# Patient Record
Sex: FEMALE | Race: BLACK OR AFRICAN AMERICAN | Employment: UNEMPLOYED | URBAN - METROPOLITAN AREA
[De-identification: names, ages, dates, MRNs, and addresses within clinical notes are randomized per-mention and may not be internally consistent; named-entity substitution may affect disease eponyms.]

---

## 2018-06-13 ENCOUNTER — HOSPITAL ENCOUNTER (OUTPATIENT)
Age: 48
Setting detail: SPECIMEN
Discharge: HOME | End: 2018-06-13
Payer: MEDICARE

## 2018-06-13 LAB
ALBUMIN SERPL-MCNC: 4.2 G/DL (ref 3.5–5.2)
ALBUMIN/GLOBULIN RATIO: 1.3 (ref 1–2.5)
ALP BLD-CCNC: 86 U/L (ref 35–104)
ALT SERPL-CCNC: 8 U/L (ref 5–33)
ANION GAP SERPL CALCULATED.3IONS-SCNC: 11 MMOL/L (ref 9–17)
AST SERPL-CCNC: 12 U/L
BILIRUB SERPL-MCNC: <0.1 MG/DL (ref 0.3–1.2)
BUN BLDV-MCNC: 2 MG/DL (ref 6–20)
BUN/CREAT BLD: ABNORMAL (ref 9–20)
CALCIUM SERPL-MCNC: 9.2 MG/DL (ref 8.6–10.4)
CHLORIDE BLD-SCNC: 103 MMOL/L (ref 98–107)
CHOLESTEROL, FASTING: 178 MG/DL
CHOLESTEROL/HDL RATIO: 5.6
CO2: 22 MMOL/L (ref 20–31)
CREAT SERPL-MCNC: 0.57 MG/DL (ref 0.5–0.9)
GFR AFRICAN AMERICAN: >60 ML/MIN
GFR NON-AFRICAN AMERICAN: >60 ML/MIN
GFR SERPL CREATININE-BSD FRML MDRD: ABNORMAL ML/MIN/{1.73_M2}
GFR SERPL CREATININE-BSD FRML MDRD: ABNORMAL ML/MIN/{1.73_M2}
GLUCOSE FASTING: 88 MG/DL (ref 70–99)
HDLC SERPL-MCNC: 32 MG/DL
LDL CHOLESTEROL: 88 MG/DL (ref 0–130)
POTASSIUM SERPL-SCNC: 3.8 MMOL/L (ref 3.7–5.3)
SODIUM BLD-SCNC: 136 MMOL/L (ref 135–144)
TOTAL PROTEIN: 7.5 G/DL (ref 6.4–8.3)
TRIGLYCERIDE, FASTING: 288 MG/DL
VLDLC SERPL CALC-MCNC: ABNORMAL MG/DL (ref 1–30)

## 2018-06-21 ENCOUNTER — HOSPITAL ENCOUNTER (OUTPATIENT)
Age: 48
Setting detail: SPECIMEN
Discharge: HOME | End: 2018-06-21
Payer: MEDICARE

## 2018-06-21 LAB
DIRECT EXAM: ABNORMAL
Lab: ABNORMAL
SPECIMEN DESCRIPTION: ABNORMAL
STATUS: ABNORMAL

## 2018-06-25 LAB
HPV SAMPLE: NORMAL
HPV SOURCE: NORMAL
HPV, GENOTYPE 16: NOT DETECTED
HPV, GENOTYPE 18: NOT DETECTED
HPV, HIGH RISK OTHER: NOT DETECTED
HPV, INTERPRETATION: NORMAL

## 2018-07-06 ENCOUNTER — HOSPITAL ENCOUNTER (OUTPATIENT)
Dept: MAMMOGRAPHY | Age: 48
Discharge: HOME | End: 2018-07-08
Payer: MEDICARE

## 2018-07-06 DIAGNOSIS — Z12.39 SCREENING BREAST EXAMINATION: ICD-10-CM

## 2018-07-06 PROCEDURE — 77067 SCR MAMMO BI INCL CAD: CPT

## 2018-07-13 LAB — CYTOLOGY REPORT: NORMAL

## 2021-07-27 ENCOUNTER — HOSPITAL ENCOUNTER (INPATIENT)
Age: 51
LOS: 1 days | Discharge: LEFT BEFORE DISCHARGE | DRG: 133 | End: 2021-07-28
Attending: EMERGENCY MEDICINE | Admitting: INTERNAL MEDICINE
Payer: MEDICARE

## 2021-07-27 ENCOUNTER — APPOINTMENT (OUTPATIENT)
Dept: CT IMAGING | Age: 51
DRG: 133 | End: 2021-07-27
Payer: MEDICARE

## 2021-07-27 ENCOUNTER — APPOINTMENT (OUTPATIENT)
Dept: GENERAL RADIOLOGY | Age: 51
DRG: 133 | End: 2021-07-27
Payer: MEDICARE

## 2021-07-27 DIAGNOSIS — R06.02 SHORTNESS OF BREATH: Primary | ICD-10-CM

## 2021-07-27 PROBLEM — R00.0 SINUS TACHYCARDIA: Status: ACTIVE | Noted: 2021-07-27

## 2021-07-27 PROBLEM — I16.0 HYPERTENSIVE URGENCY: Status: ACTIVE | Noted: 2021-07-27

## 2021-07-27 PROBLEM — F10.129 ALCOHOL ABUSE WITH INTOXICATION (HCC): Status: ACTIVE | Noted: 2021-07-27

## 2021-07-27 PROBLEM — K52.9 DIARRHEA DUE TO ALCOHOL INTAKE: Status: ACTIVE | Noted: 2021-07-27

## 2021-07-27 PROBLEM — E87.6 HYPOKALEMIA: Status: ACTIVE | Noted: 2021-07-27

## 2021-07-27 PROBLEM — F17.200 SMOKING ADDICTION: Status: ACTIVE | Noted: 2021-07-27

## 2021-07-27 PROBLEM — J96.01 ACUTE RESPIRATORY FAILURE WITH HYPOXIA (HCC): Status: ACTIVE | Noted: 2021-07-27

## 2021-07-27 PROBLEM — R77.8 TROPONIN LEVEL ELEVATED: Status: ACTIVE | Noted: 2021-07-27

## 2021-07-27 PROBLEM — R79.89 TROPONIN LEVEL ELEVATED: Status: ACTIVE | Noted: 2021-07-27

## 2021-07-27 LAB
-: NORMAL
ABSOLUTE EOS #: 0.03 K/UL (ref 0–0.44)
ABSOLUTE IMMATURE GRANULOCYTE: <0.03 K/UL (ref 0–0.3)
ABSOLUTE LYMPH #: 2.92 K/UL (ref 1.1–3.7)
ABSOLUTE MONO #: 0.46 K/UL (ref 0.1–1.2)
ALLEN TEST: ABNORMAL
ANION GAP SERPL CALCULATED.3IONS-SCNC: 18 MMOL/L (ref 9–17)
BASOPHILS # BLD: 0 % (ref 0–2)
BASOPHILS ABSOLUTE: <0.03 K/UL (ref 0–0.2)
BNP INTERPRETATION: NORMAL
BUN BLDV-MCNC: 3 MG/DL (ref 6–20)
BUN/CREAT BLD: ABNORMAL (ref 9–20)
CALCIUM SERPL-MCNC: 8.9 MG/DL (ref 8.6–10.4)
CARBOXYHEMOGLOBIN: 0.8 % (ref 0–5)
CHLORIDE BLD-SCNC: 102 MMOL/L (ref 98–107)
CO2: 19 MMOL/L (ref 20–31)
CREAT SERPL-MCNC: 0.62 MG/DL (ref 0.5–0.9)
D-DIMER QUANTITATIVE: 0.74 MG/L FEU
DIFFERENTIAL TYPE: ABNORMAL
EOSINOPHILS RELATIVE PERCENT: 1 % (ref 1–4)
FIO2: ABNORMAL
GFR AFRICAN AMERICAN: >60 ML/MIN
GFR NON-AFRICAN AMERICAN: >60 ML/MIN
GFR SERPL CREATININE-BSD FRML MDRD: ABNORMAL ML/MIN/{1.73_M2}
GFR SERPL CREATININE-BSD FRML MDRD: ABNORMAL ML/MIN/{1.73_M2}
GLUCOSE BLD-MCNC: 145 MG/DL (ref 70–99)
HCG QUALITATIVE: NEGATIVE
HCO3 VENOUS: 23.7 MMOL/L (ref 24–30)
HCT VFR BLD CALC: 43.3 % (ref 36.3–47.1)
HEMOGLOBIN: 13.7 G/DL (ref 11.9–15.1)
IMMATURE GRANULOCYTES: 0 %
LYMPHOCYTES # BLD: 48 % (ref 24–43)
MAGNESIUM: 1.9 MG/DL (ref 1.6–2.6)
MCH RBC QN AUTO: 29.6 PG (ref 25.2–33.5)
MCHC RBC AUTO-ENTMCNC: 31.6 G/DL (ref 28.4–34.8)
MCV RBC AUTO: 93.5 FL (ref 82.6–102.9)
METHEMOGLOBIN: ABNORMAL % (ref 0–1.5)
MODE: ABNORMAL
MONOCYTES # BLD: 8 % (ref 3–12)
NEGATIVE BASE EXCESS, VEN: 0.2 MMOL/L (ref 0–2)
NOTIFICATION TIME: ABNORMAL
NOTIFICATION: ABNORMAL
NRBC AUTOMATED: 0 PER 100 WBC
O2 DEVICE/FLOW/%: ABNORMAL
O2 SAT, VEN: 86.8 % (ref 60–85)
OXYHEMOGLOBIN: ABNORMAL % (ref 95–98)
PATIENT TEMP: 37
PCO2, VEN, TEMP ADJ: ABNORMAL MMHG (ref 39–55)
PCO2, VEN: 38.3 (ref 39–55)
PDW BLD-RTO: 14.5 % (ref 11.8–14.4)
PEEP/CPAP: ABNORMAL
PH VENOUS: 7.41 (ref 7.32–7.42)
PH, VEN, TEMP ADJ: ABNORMAL (ref 7.32–7.42)
PLATELET # BLD: ABNORMAL K/UL (ref 138–453)
PLATELET ESTIMATE: ABNORMAL
PLATELET, FLUORESCENCE: NORMAL K/UL (ref 138–453)
PLATELET, IMMATURE FRACTION: NORMAL % (ref 1.1–10.3)
PMV BLD AUTO: ABNORMAL FL (ref 8.1–13.5)
PO2, VEN, TEMP ADJ: ABNORMAL MMHG (ref 30–50)
PO2, VEN: 52.6 (ref 30–50)
POSITIVE BASE EXCESS, VEN: ABNORMAL MMOL/L (ref 0–2)
POTASSIUM SERPL-SCNC: 2.9 MMOL/L (ref 3.7–5.3)
PRO-BNP: 202 PG/ML
PSV: ABNORMAL
PT. POSITION: ABNORMAL
RBC # BLD: 4.63 M/UL (ref 3.95–5.11)
RBC # BLD: ABNORMAL 10*6/UL
REASON FOR REJECTION: NORMAL
RESPIRATORY RATE: ABNORMAL
SAMPLE SITE: ABNORMAL
SARS-COV-2, RAPID: NOT DETECTED
SEG NEUTROPHILS: 43 % (ref 36–65)
SEGMENTED NEUTROPHILS ABSOLUTE COUNT: 2.61 K/UL (ref 1.5–8.1)
SET RATE: ABNORMAL
SODIUM BLD-SCNC: 139 MMOL/L (ref 135–144)
SPECIMEN DESCRIPTION: NORMAL
TEXT FOR RESPIRATORY: ABNORMAL
THYROXINE, FREE: 1.21 NG/DL (ref 0.93–1.7)
TOTAL HB: ABNORMAL G/DL (ref 12–16)
TOTAL RATE: ABNORMAL
TROPONIN INTERP: ABNORMAL
TROPONIN T: ABNORMAL NG/ML
TROPONIN, HIGH SENSITIVITY: 22 NG/L (ref 0–14)
TROPONIN, HIGH SENSITIVITY: 24 NG/L (ref 0–14)
TROPONIN, HIGH SENSITIVITY: 33 NG/L (ref 0–14)
TROPONIN, HIGH SENSITIVITY: 35 NG/L (ref 0–14)
TSH SERPL DL<=0.05 MIU/L-ACNC: 0.2 MIU/L (ref 0.3–5)
VT: ABNORMAL
WBC # BLD: 6.1 K/UL (ref 3.5–11.3)
WBC # BLD: ABNORMAL 10*3/UL
ZZ NTE CLEAN UP: ORDERED TEST: NORMAL
ZZ NTE WITH NAME CLEAN UP: SPECIMEN SOURCE: NORMAL

## 2021-07-27 PROCEDURE — 85055 RETICULATED PLATELET ASSAY: CPT

## 2021-07-27 PROCEDURE — 6370000000 HC RX 637 (ALT 250 FOR IP)

## 2021-07-27 PROCEDURE — 6360000002 HC RX W HCPCS: Performed by: GENERAL PRACTICE

## 2021-07-27 PROCEDURE — 1200000000 HC SEMI PRIVATE

## 2021-07-27 PROCEDURE — 84443 ASSAY THYROID STIM HORMONE: CPT

## 2021-07-27 PROCEDURE — 2700000000 HC OXYGEN THERAPY PER DAY

## 2021-07-27 PROCEDURE — 94761 N-INVAS EAR/PLS OXIMETRY MLT: CPT

## 2021-07-27 PROCEDURE — 80048 BASIC METABOLIC PNL TOTAL CA: CPT

## 2021-07-27 PROCEDURE — 71260 CT THORAX DX C+: CPT

## 2021-07-27 PROCEDURE — 84703 CHORIONIC GONADOTROPIN ASSAY: CPT

## 2021-07-27 PROCEDURE — 94640 AIRWAY INHALATION TREATMENT: CPT

## 2021-07-27 PROCEDURE — 84484 ASSAY OF TROPONIN QUANT: CPT

## 2021-07-27 PROCEDURE — 6360000004 HC RX CONTRAST MEDICATION: Performed by: GENERAL PRACTICE

## 2021-07-27 PROCEDURE — 71045 X-RAY EXAM CHEST 1 VIEW: CPT

## 2021-07-27 PROCEDURE — 83880 ASSAY OF NATRIURETIC PEPTIDE: CPT

## 2021-07-27 PROCEDURE — 6360000002 HC RX W HCPCS

## 2021-07-27 PROCEDURE — 82805 BLOOD GASES W/O2 SATURATION: CPT

## 2021-07-27 PROCEDURE — 99285 EMERGENCY DEPT VISIT HI MDM: CPT

## 2021-07-27 PROCEDURE — 94664 DEMO&/EVAL PT USE INHALER: CPT

## 2021-07-27 PROCEDURE — 94660 CPAP INITIATION&MGMT: CPT

## 2021-07-27 PROCEDURE — 87635 SARS-COV-2 COVID-19 AMP PRB: CPT

## 2021-07-27 PROCEDURE — 85025 COMPLETE CBC W/AUTO DIFF WBC: CPT

## 2021-07-27 PROCEDURE — 36415 COLL VENOUS BLD VENIPUNCTURE: CPT

## 2021-07-27 PROCEDURE — 99223 1ST HOSP IP/OBS HIGH 75: CPT | Performed by: INTERNAL MEDICINE

## 2021-07-27 PROCEDURE — 83735 ASSAY OF MAGNESIUM: CPT

## 2021-07-27 PROCEDURE — 82746 ASSAY OF FOLIC ACID SERUM: CPT

## 2021-07-27 PROCEDURE — 84439 ASSAY OF FREE THYROXINE: CPT

## 2021-07-27 PROCEDURE — 85379 FIBRIN DEGRADATION QUANT: CPT

## 2021-07-27 PROCEDURE — 96374 THER/PROPH/DIAG INJ IV PUSH: CPT

## 2021-07-27 PROCEDURE — 6370000000 HC RX 637 (ALT 250 FOR IP): Performed by: GENERAL PRACTICE

## 2021-07-27 PROCEDURE — 82607 VITAMIN B-12: CPT

## 2021-07-27 PROCEDURE — 93005 ELECTROCARDIOGRAM TRACING: CPT | Performed by: GENERAL PRACTICE

## 2021-07-27 RX ORDER — SODIUM CHLORIDE 0.9 % (FLUSH) 0.9 %
5-40 SYRINGE (ML) INJECTION PRN
Status: DISCONTINUED | OUTPATIENT
Start: 2021-07-27 | End: 2021-07-28 | Stop reason: HOSPADM

## 2021-07-27 RX ORDER — ONDANSETRON 4 MG/1
4 TABLET, ORALLY DISINTEGRATING ORAL EVERY 8 HOURS PRN
Status: DISCONTINUED | OUTPATIENT
Start: 2021-07-27 | End: 2021-07-28 | Stop reason: HOSPADM

## 2021-07-27 RX ORDER — LABETALOL HYDROCHLORIDE 5 MG/ML
10 INJECTION, SOLUTION INTRAVENOUS EVERY 4 HOURS PRN
Status: DISCONTINUED | OUTPATIENT
Start: 2021-07-27 | End: 2021-07-28 | Stop reason: HOSPADM

## 2021-07-27 RX ORDER — ALBUTEROL SULFATE 2.5 MG/3ML
2.5 SOLUTION RESPIRATORY (INHALATION) EVERY 6 HOURS PRN
Status: DISCONTINUED | OUTPATIENT
Start: 2021-07-27 | End: 2021-07-28 | Stop reason: HOSPADM

## 2021-07-27 RX ORDER — ONDANSETRON 2 MG/ML
4 INJECTION INTRAMUSCULAR; INTRAVENOUS EVERY 6 HOURS PRN
Status: DISCONTINUED | OUTPATIENT
Start: 2021-07-27 | End: 2021-07-28 | Stop reason: HOSPADM

## 2021-07-27 RX ORDER — IPRATROPIUM BROMIDE AND ALBUTEROL SULFATE 2.5; .5 MG/3ML; MG/3ML
1 SOLUTION RESPIRATORY (INHALATION) 3 TIMES DAILY
Status: DISCONTINUED | OUTPATIENT
Start: 2021-07-28 | End: 2021-07-28 | Stop reason: HOSPADM

## 2021-07-27 RX ORDER — SODIUM CHLORIDE 0.9 % (FLUSH) 0.9 %
5-40 SYRINGE (ML) INJECTION EVERY 12 HOURS SCHEDULED
Status: DISCONTINUED | OUTPATIENT
Start: 2021-07-27 | End: 2021-07-28 | Stop reason: HOSPADM

## 2021-07-27 RX ORDER — FOLIC ACID 1 MG/1
1 TABLET ORAL DAILY
Status: DISCONTINUED | OUTPATIENT
Start: 2021-07-28 | End: 2021-07-28 | Stop reason: HOSPADM

## 2021-07-27 RX ORDER — POLYETHYLENE GLYCOL 3350 17 G/17G
17 POWDER, FOR SOLUTION ORAL DAILY PRN
Status: DISCONTINUED | OUTPATIENT
Start: 2021-07-27 | End: 2021-07-28 | Stop reason: HOSPADM

## 2021-07-27 RX ORDER — AZITHROMYCIN 250 MG/1
250 TABLET, FILM COATED ORAL DAILY
Status: DISCONTINUED | OUTPATIENT
Start: 2021-07-28 | End: 2021-07-28 | Stop reason: HOSPADM

## 2021-07-27 RX ORDER — IPRATROPIUM BROMIDE AND ALBUTEROL SULFATE 2.5; .5 MG/3ML; MG/3ML
1 SOLUTION RESPIRATORY (INHALATION) 4 TIMES DAILY
Status: DISCONTINUED | OUTPATIENT
Start: 2021-07-27 | End: 2021-07-27

## 2021-07-27 RX ORDER — AZITHROMYCIN 250 MG/1
500 TABLET, FILM COATED ORAL DAILY
Status: COMPLETED | OUTPATIENT
Start: 2021-07-27 | End: 2021-07-27

## 2021-07-27 RX ORDER — METHYLPREDNISOLONE SODIUM SUCCINATE 125 MG/2ML
125 INJECTION, POWDER, LYOPHILIZED, FOR SOLUTION INTRAMUSCULAR; INTRAVENOUS ONCE
Status: COMPLETED | OUTPATIENT
Start: 2021-07-27 | End: 2021-07-27

## 2021-07-27 RX ORDER — ACETAMINOPHEN 325 MG/1
650 TABLET ORAL EVERY 6 HOURS PRN
Status: DISCONTINUED | OUTPATIENT
Start: 2021-07-27 | End: 2021-07-28 | Stop reason: HOSPADM

## 2021-07-27 RX ORDER — ACETAMINOPHEN 650 MG/1
650 SUPPOSITORY RECTAL EVERY 6 HOURS PRN
Status: DISCONTINUED | OUTPATIENT
Start: 2021-07-27 | End: 2021-07-28 | Stop reason: HOSPADM

## 2021-07-27 RX ORDER — SODIUM CHLORIDE 9 MG/ML
25 INJECTION, SOLUTION INTRAVENOUS PRN
Status: DISCONTINUED | OUTPATIENT
Start: 2021-07-27 | End: 2021-07-28 | Stop reason: HOSPADM

## 2021-07-27 RX ORDER — METHYLPREDNISOLONE SODIUM SUCCINATE 125 MG/2ML
40 INJECTION, POWDER, LYOPHILIZED, FOR SOLUTION INTRAMUSCULAR; INTRAVENOUS EVERY 12 HOURS
Status: DISCONTINUED | OUTPATIENT
Start: 2021-07-28 | End: 2021-07-28 | Stop reason: HOSPADM

## 2021-07-27 RX ORDER — GAUZE BANDAGE 2" X 2"
50 BANDAGE TOPICAL DAILY
Status: DISCONTINUED | OUTPATIENT
Start: 2021-07-28 | End: 2021-07-28 | Stop reason: HOSPADM

## 2021-07-27 RX ORDER — ONDANSETRON 2 MG/ML
4 INJECTION INTRAMUSCULAR; INTRAVENOUS ONCE
Status: DISCONTINUED | OUTPATIENT
Start: 2021-07-27 | End: 2021-07-28 | Stop reason: HOSPADM

## 2021-07-27 RX ORDER — IPRATROPIUM BROMIDE AND ALBUTEROL SULFATE 2.5; .5 MG/3ML; MG/3ML
1 SOLUTION RESPIRATORY (INHALATION) ONCE
Status: COMPLETED | OUTPATIENT
Start: 2021-07-27 | End: 2021-07-27

## 2021-07-27 RX ADMIN — IPRATROPIUM BROMIDE AND ALBUTEROL SULFATE 1 AMPULE: .5; 3 SOLUTION RESPIRATORY (INHALATION) at 11:02

## 2021-07-27 RX ADMIN — ENOXAPARIN SODIUM 40 MG: 40 INJECTION SUBCUTANEOUS at 19:36

## 2021-07-27 RX ADMIN — IPRATROPIUM BROMIDE AND ALBUTEROL SULFATE 1 AMPULE: .5; 3 SOLUTION RESPIRATORY (INHALATION) at 19:20

## 2021-07-27 RX ADMIN — IPRATROPIUM BROMIDE AND ALBUTEROL SULFATE 1 AMPULE: .5; 3 SOLUTION RESPIRATORY (INHALATION) at 06:47

## 2021-07-27 RX ADMIN — IPRATROPIUM BROMIDE AND ALBUTEROL SULFATE 1 AMPULE: .5; 3 SOLUTION RESPIRATORY (INHALATION) at 15:21

## 2021-07-27 RX ADMIN — METHYLPREDNISOLONE SODIUM SUCCINATE 125 MG: 125 INJECTION, POWDER, FOR SOLUTION INTRAMUSCULAR; INTRAVENOUS at 06:36

## 2021-07-27 RX ADMIN — IOPAMIDOL 75 ML: 755 INJECTION, SOLUTION INTRAVENOUS at 08:09

## 2021-07-27 RX ADMIN — AZITHROMYCIN 500 MG: 250 TABLET, FILM COATED ORAL at 14:04

## 2021-07-27 RX ADMIN — ALBUTEROL SULFATE 2.5 MG: 2.5 SOLUTION RESPIRATORY (INHALATION) at 06:40

## 2021-07-27 RX ADMIN — POTASSIUM BICARBONATE 40 MEQ: 782 TABLET, EFFERVESCENT ORAL at 09:56

## 2021-07-27 ASSESSMENT — ENCOUNTER SYMPTOMS
VOMITING: 0
SHORTNESS OF BREATH: 1
COUGH: 1
NAUSEA: 0

## 2021-07-27 ASSESSMENT — PAIN SCALES - GENERAL: PAINLEVEL_OUTOF10: 0

## 2021-07-27 NOTE — ED NOTES
Bed: 19  Expected date:   Expected time:   Means of arrival:   Comments:  ABELARDO Barber RN  07/27/21 0366

## 2021-07-27 NOTE — PROGRESS NOTES
Senior Note       This is a 49 y.o. female admitted 7/27/2021 for Shortness of breath [R06.02]. See H&P of admitting/intern resident for more details. Chief Complaint :      Shortness of breath with productive cough since 1 day   Diarrhea  Loss of appetite  Sore throat         Significant Past Medical History :      Chronic active smoking   Alcohol     HPI:     Patient has a recent vomiting where she drank excess alcohol than her usual 1-2 glases of beer and she is a known smoker with active smoking of pack a day and is having SOB since many days and had acute worsening of SOB since yesterday and came to ED for evaluation. Shortness of breath gradual since many days with acute worsening,  Orthopnea +, PND negative, dyspnea on exertion + not  associated with  Chest pain and palpitations. Not on any home oxygen. Initial evaluation in the ED patient was found to be hypoxic with back to using started on BiPAP 30% FiO2. No VBG was drawn on admission. She was found to be tachycardic with heart rate of 123 blood pressure of 163/99. Wells score of  1 with elevation of D-dimer 0.74 patient was taken to CT PE which came back negative. Chest x-ray did not show any signs of congestion or infiltrate. Troponins elevated to 22--> 35-->33 with EKG negative for any ischemic or infarct changes and cardiology was consulted for increased troponinemia. ROS :  Negative for chest pain, palpitations, calf tenderness, leg swelling, immobilization, Denies fever and positive for chills    Past Surgical History : No recent surgery.     Allergic History :  denies    Home Medications : Not on any home medications    Hospitalization history : Not hospitalized      Social history :   Smoking : Active smoker pack a day    Alcohol :  Positive alcohol intake 1-2 beers often do not drink daily, recent heavy alcohol intoxication during party     Recreational drug : admits to smoking marijuana     PCP/specialist doctor : Not seen any PCP    Vitals : Hemodynamically stable currently with elevated blood pressure of 141/99 saturating 100% on 3 L nasal cannula    Brief Examination Findings:     Mild expiratory wheezing bilateral upper and lower lobes after IV Solu-Medrol was given, no calf tenderness, no abnormal murmurs. Looks dehydrated and tachycardic pulses regularly regular    Initial Labs :      Potassium 2.9 bicarb of 19 and anion gap of 18 elevated troponins    BMP:   Recent Labs     07/27/21  0727      K 2.9*      CO2 19*   BUN 3*   CREATININE 0.62   GLUCOSE 145*     CBC: )  Recent Labs     07/27/21  0647   WBC 6.1   HGB 13.7   HCT 43.3   PLT See Reflexed IPF Result          Imaging :     Chest x-ray negative for any infiltrates or fluid congestion    CT PE negative    Assessment & Plan :    Principal Problem:    Acute respiratory failure with hypoxia (HCC)  Active Problems:    Hypertensive urgency    Shortness of breath    Sinus tachycardia    Hypokalemia    Smoking addiction    Alcohol abuse with intoxication (HCC)    Diarrhea due to alcohol intake    Troponin level elevated  Resolved Problems:    * No resolved hospital problems. *      Acute hypoxic respiratory failure likely secondary to undiagnosed COPD   -follow-up with VBG, continue nasal cannula oxygen 3 L, BiPAP as needed, advise quitting smoking, start inhalation nebulization with DuoNeb. Follow-up respiratory cultures, pulmonary function test as outpatient, home oxygen evaluation before discharge, outpatient follow-up with pulmonary for COPD    Hypertensive urgency (severe asymptomatic hypertension) without endorgan damage likely secondary to recent heavy alcohol intoxication/undiagnosed hypertension  No signs of encephalopathy (severe headache, seizure, AMS) , TIA, papilledema, pulmonary edema, MI, angina,  ALKA/hematuria. Reduce blood pressure to <160/100.   Reduce blood pressure by 25-30% over the first 2 to 4 hours. Short acting antihypertensives tomorrow with oral antihypertensives with amlodipine 10 mg daily and titrate accordingly    Sinus tachycardia likely secondary to COPD exacerbation/recent alcohol [id number] with EKG, TSH. Hypokalemia secondary to increased loss  with hypertension and associated  diarrhea from alcohol intoxication without eating  K 2.6,  Obtain   Mg level    Rule out   thyrotoxicosis with serum TSH   EKG NEGATIVE for Any U waves, flat or inverted waves. Work-up for hyperaldosteronism as OP for secondary hypertension if undiagnosed    Smoking addiction : advise quitting smoking  And  alcohol    Elevated troponins likely secondary to demand ischemia -cardiology has been consulted from ED for elevated troponinemia. EKG is negative for any ischemic changes, trend troponins and follow-up with cardiology input in regards to the management given multiple coronary risk factors with active smoking and undiagnosed hypertension. Home oxygen evaluation before discharge. Resume Home medications as tolerated by the patient. Diet : Cardiac diet  DVT prophylaxis : enoxaparin    PT & OT: Consulted   : To assist in discharge planning. Rubens Peter MD  Internal Medicine Resident, PGY- [facility];  [place], [place]  7/27/2021, 12:25 PM

## 2021-07-27 NOTE — ED NOTES
Labeled blood specimen collected and sent to lab via tube system.        Sky Collado RN  07/27/21 0410

## 2021-07-27 NOTE — ED NOTES
Pt states that she had to cough, and when she coughed, she ended up vomiting. Pt states that she pulled the bipap up as fast as she could. Dr. Avis Mathis notified. Pt speaking in short sentences.  Hanley Essex, RT at bedside      Lou Pearson RN  07/27/21 5691

## 2021-07-27 NOTE — ED PROVIDER NOTES
[address] [facility]   Emergency Department  Faculty Attestation       I performed a history and physical examination of the patient and discussed management with the resident. I reviewed the residents note and agree with the documented findings including all diagnostic interpretations and plan of care. Any areas of disagreement are noted on the chart. I was personally present for the key portions of any procedures. I have documented in the chart those procedures where I was not present during the key portions. I have reviewed the emergency nurses triage note. I agree with the chief complaint, past medical history, past surgical history, allergies, medications, social and family history as documented unless otherwise noted below. Documentation of the HPI, Physical Exam and Medical Decision Making performed by scribkayleen is based on my personal performance of the HPI, PE and MDM. For Physician Assistant/ Nurse Practitioner cases/documentation I have personally evaluated this patient and have completed at least one if not all key elements of the E/M (history, physical exam, and MDM). Additional findings are as noted. Pertinent Comments     Primary Care Physician: Dylan Hoover      ED Triage Vitals [07/27/21 0627]   BP Temp Temp Source Pulse Resp SpO2 Height Weight   -- 98.7 °F (37.1 °C) Oral 123 14 98 % 5' 2\" (1.575 m) 125 lb (56.7 kg)        History/Physical: This is a 49 y.o. female who presents to the Emergency Department with complaint of shortness of breath and cough. Given breathing treatment in route. Patient has no history of shortness of breath like this previously, but does smoke. She has no significant cardiac history. On exam, patient appears to be in mild acute respiratory distress. She is increased work of breathing, and is on BiPAP during my evaluation. She is normocephalic atraumatic. Heart sounds are tachycardic but regular. Lungs with diffuse wheezing. With good air entry.  Abdomen soft nontender nondistended. Lower extremities no significant pitting edema. She is awake alert and following commands. MDM/Plan:   Acute respiratory distress, she does have increased work of breathing as well as diffuse wheezing. Currently on BiPAP, saturating well but still tachypneic. No significant history of any respiratory issues. Does smoke, so this may be an underlying COPD exacerbation. Will do cardiac work-up as well as D-dimer and chest x-ray. EKG with tachycardia, significant artifact. Questionable possible V6 depressions, will repeat. Critical Care: There was significant risk of life threatening deterioration of patient's condition requiring my direct management. Critical care time 15 minutes, excluding any documented procedures.     Mary Painting MD  Attending Emergency Physician         Mary Painting MD  07/30/21 6014

## 2021-07-27 NOTE — ED NOTES
Pt woke from sleep with sudden difficulty breathing. Pt was also HTN and tachycardic on EMS arrival. Pt was given 1 nitro and an albuterol treatment. On arrival pt was tripoding with croup sounding cough and grunting. Pt is alert and oriented. Placed on cardiac monitor, EKG and IV established.  Will continue to monitor       Cait Schumacher RN  07/27/21 2925

## 2021-07-27 NOTE — ED PROVIDER NOTES
[address] [facility]  ED  Emergency Department Encounter  EmergencyMedicine Resident     Pt Name:Bre Quintero  MRN: [id number]  [place] 1970  Date of evaluation: 7/27/21  PCP:  [facility]       Chief Complaint   Patient presents with    Shortness of Breath    Hypertension     HR 160s on ems arrival       HISTORY OF PRESENT ILLNESS  (Location/Symptom, Timing/Onset, Context/Setting, Quality, Duration, Modifying Factors, Severity.)      Ely Ferguson is a 49 y.o. female who presents with worsening shortness of breath since yesterday, did have orthopnea, cough presented in respiratory distress, does have history of smoking patient did have recent episode of vomiting. Patient with dyspnea on exertion denies any chest pain. Denies any nausea vomiting. Upon arrival patient was found to be hypoxic was started on BiPAP EMS states that patient was initially extremely tachycardic and hypertensive was given nitro which did improve. Patient denies any history of DVT or PE. PAST MEDICAL / SURGICAL / SOCIAL / FAMILY HISTORY      has no past medical history on file. has a past surgical history that includes Carpal tunnel release.     Social History     Socioeconomic History    Marital status: Single     Spouse name: Not on file    Number of children: Not on file    Years of education: Not on file    Highest education level: Not on file   Occupational History    Not on file   Tobacco Use    Smoking status: Current Every Day Smoker     Packs/day: 1.00   Substance and Sexual Activity    Alcohol use: Yes     Comment: socially    Drug use: No    Sexual activity: Not on file   Other Topics Concern    Not on file   Social History Narrative    Not on file     Social Determinants of Health     Financial Resource Strain:     Difficulty of Paying Living Expenses:    Food Insecurity:     Worried About Running Out of Food in the Last Year:     [address] in the Last Year: Transportation Needs:     Lack of Transportation (Medical):  Lack of Transportation (Non-Medical):    Physical Activity:     Days of Exercise per Week:     Minutes of Exercise per Session:    Stress:     Feeling of Stress :    Social Connections:     Frequency of Communication with Friends and Family:     Frequency of Social Gatherings with Friends and Family:     Attends [employer] Services:     Active Member of Clubs or Organizations:     Attends Club or Organization Meetings:     Marital Status:    Intimate Partner Violence:     Fear of Current or Ex-Partner:     Emotionally Abused:     Physically Abused:     Sexually Abused:        No family history on file. Allergies:  Patient has no known allergies. Home Medications:  Prior to Admission medications    Not on File       REVIEW OF SYSTEMS    (2-9 systems for level 4, 10 or more for level 5)      Review of Systems   Constitutional: Positive for activity change and appetite change. Negative for chills and fever. Respiratory: Positive for cough and shortness of breath. Cardiovascular: Negative for chest pain. Gastrointestinal: Negative for nausea and vomiting. Skin: Negative for rash and wound. Neurological: Negative for dizziness, light-headedness and headaches. Psychiatric/Behavioral: Negative for agitation and confusion. The patient is not nervous/anxious. PHYSICAL EXAM   (up to 7 for level 4, 8 or more for level 5)      INITIAL VITALS:   BP (!) 151/99   Pulse 92   Temp 98.7 °F (37.1 °C) (Oral)   Resp 15   Ht 5' 2\" (1.575 m)   Wt 125 lb (56.7 kg)   SpO2 100%   BMI 22.86 kg/m²     Physical Exam  Vitals reviewed. Constitutional:       General: She is in acute distress. Appearance: She is well-developed. She is ill-appearing. She is not toxic-appearing or diaphoretic. HENT:      Head: Normocephalic and atraumatic. Pulmonary:      Breath sounds: Wheezing present.  No decreased breath sounds, rhonchi or ipratropium-albuterol (DUONEB) nebulizer solution 1 ampule     Order Specific Question:   Initiate RT Bronchodilator Protocol     Answer: Yes    ipratropium-albuterol (DUONEB) nebulizer solution 1 ampule     Order Specific Question:   Initiate RT Bronchodilator Protocol     Answer: Yes    iopamidol (ISOVUE-370) 76 % injection 75 mL    ondansetron (ZOFRAN) injection 4 mg    potassium bicarb-citric acid (EFFER-K) effervescent tablet 40 mEq    sodium chloride flush 0.9 % injection 5-40 mL    sodium chloride flush 0.9 % injection 5-40 mL    0.9 % sodium chloride infusion    enoxaparin (LOVENOX) injection 40 mg    OR Linked Order Group     ondansetron (ZOFRAN-ODT) disintegrating tablet 4 mg     ondansetron (ZOFRAN) injection 4 mg    polyethylene glycol (GLYCOLAX) packet 17 g    OR Linked Order Group     acetaminophen (TYLENOL) tablet 650 mg     acetaminophen (TYLENOL) suppository 650 mg    FOLLOWED BY Linked Order Group     azithromycin (ZITHROMAX) tablet 500 mg      Order Specific Question:   Antimicrobial Indications      Answer:   COPD Exacerbation     azithromycin (ZITHROMAX) tablet 250 mg      Order Specific Question:   Antimicrobial Indications      Answer:   COPD Exacerbation    methylPREDNISolone sodium (SOLU-MEDROL) injection 40 mg    thiamine mononitrate tablet 50 mg    folic acid (FOLVITE) tablet 1 mg       DDX: Asthma exacerbation, COPD exacerbation, pneumonia, PE, ACS/MI, low suspicion for Boerhaave's or Zaira-Hughes    DIAGNOSTIC RESULTS / EMERGENCY DEPARTMENT COURSE / MDM   :  Results for orders placed or performed during the hospital encounter of 07/27/21   COVID-19, Rapid    Specimen: Nasopharyngeal Swab   Result Value Ref Range    Specimen Description . NASOPHARYNGEAL SWAB     SARS-CoV-2, Rapid Not Detected Not Detected   Troponin   Result Value Ref Range    Troponin, High Sensitivity 35 (H) 0 - 14 ng/L    Troponin T NOT REPORTED <0.03 ng/mL    Troponin Interp NOT REPORTED CBC Auto Differential   Result Value Ref Range    WBC 6.1 3.5 - 11.3 k/uL    RBC 4.63 3.95 - 5.11 m/uL    Hemoglobin 13.7 11.9 - 15.1 g/dL    Hematocrit 43.3 36.3 - 47.1 %    MCV 93.5 82.6 - 102.9 fL    MCH 29.6 25.2 - 33.5 pg    MCHC 31.6 28.4 - 34.8 g/dL    RDW 14.5 (H) 11.8 - 14.4 %    Platelets See Reflexed IPF Result 138 - 453 k/uL    MPV NOT REPORTED 8.1 - 13.5 fL    NRBC Automated 0.0 0.0 per 100 WBC    Differential Type NOT REPORTED     WBC Morphology NOT REPORTED     RBC Morphology ANISOCYTOSIS PRESENT     Platelet Estimate NOT REPORTED     Seg Neutrophils 43 36 - 65 %    Lymphocytes 48 (H) 24 - 43 %    Monocytes 8 3 - 12 %    Eosinophils % 1 1 - 4 %    Basophils 0 0 - 2 %    Immature Granulocytes 0 0 %    Segs Absolute 2.61 1.50 - 8.10 k/uL    Absolute Lymph # 2.92 1.10 - 3.70 k/uL    Absolute Mono # 0.46 0.10 - 1.20 k/uL    Absolute Eos # 0.03 0.00 - 0.44 k/uL    Basophils Absolute <0.03 0.00 - 0.20 k/uL    Absolute Immature Granulocyte <0.03 0.00 - 0.30 k/uL   D-DIMER, QUANTITATIVE   Result Value Ref Range    D-Dimer, Quant 0.74 mg/L FEU   HCG Qualitative, Serum   Result Value Ref Range    hCG Qual NEGATIVE NEGATIVE   Immature Platelet Fraction   Result Value Ref Range    Platelet, Immature Fraction NOT REPORTED 1.1 - 10.3 %    Platelet, Fluorescence Platelet clumps present, count appears decreased. 138 - 453 k/uL   SPECIMEN REJECTION   Result Value Ref Range    Specimen Source . BLOOD     Ordered Test BMP BNP TROPI     Reason for Rejection Unable to perform testing: Specimen hemolyzed.      - NOT REPORTED    Basic Metabolic Panel   Result Value Ref Range    Glucose 145 (H) 70 - 99 mg/dL    BUN 3 (L) 6 - 20 mg/dL    CREATININE 0.62 0.50 - 0.90 mg/dL    Bun/Cre Ratio NOT REPORTED 9 - 20    Calcium 8.9 8.6 - 10.4 mg/dL    Sodium 139 135 - 144 mmol/L    Potassium 2.9 (LL) 3.7 - 5.3 mmol/L    Chloride 102 98 - 107 mmol/L    CO2 19 (L) 20 - 31 mmol/L    Anion Gap 18 (H) 9 - 17 mmol/L    GFR Non-African American >60 >60 mL/min    GFR African American >60 >60 mL/min    GFR Comment          GFR Staging NOT REPORTED    Brain Natriuretic Peptide   Result Value Ref Range    Pro- <300 pg/mL    BNP Interpretation Pro-BNP Reference Range:    Troponin   Result Value Ref Range    Troponin, High Sensitivity 22 (H) 0 - 14 ng/L    Troponin T NOT REPORTED <0.03 ng/mL    Troponin Interp NOT REPORTED    Troponin   Result Value Ref Range    Troponin, High Sensitivity 33 (H) 0 - 14 ng/L    Troponin T NOT REPORTED <0.03 ng/mL    Troponin Interp NOT REPORTED          RADIOLOGY:  Impression: 1. No evidence of pulmonary embolism or acute pulmonary abnormality     Impression: No focal airspace consolidation or edema/CHF. EKG  Sinus tachycardia, EKG degraded by respiratory variation. Sinus tachycardia, rate 116 bpm, normal axis, normal intervals, questionable depression in V5 V6 likely artifact, no acute ST or T wave abnormalities noted. All EKG's are interpreted by the Emergency Department Physician who either signs or Co-signs this chart in the absence of a cardiologist.    EMERGENCY DEPARTMENT COURSE/IMPRESSION: 56-year-old female appears older than stated age presenting in respiratory distress tachycardic, was given breathing treatment, Solu-Medrol, placed on BiPAP upon arrival patient symptoms improved patient did have vomiting while wearing BiPAP was able get mask off no signs of aspiration. Patient is dimer was elevated CT scan was obtained which shows no evidence of PE. Patient's troponin initially did increase however trended down. Patient required multiple breathing treatments, symptoms did slightly improve, will plan to admit patient to internal medicine for further evaluation and breathing treatments. Cardiology consulted will plan to evaluate patient while admitted.         PROCEDURES:  None    CONSULTS:  IP CONSULT TO CARDIOLOGY  IP CONSULT TO INTERNAL MEDICINE  IP CONSULT TO CASE MANAGEMENT    CRITICAL CARE:  None    FINAL IMPRESSION      1. Shortness of breath          DISPOSITION / PLAN     DISPOSITION Admitted 07/27/2021 11:38:19 AM      PATIENT REFERRED TO:  No follow-up provider specified.     DISCHARGE MEDICATIONS:  New Prescriptions    No medications on file       Ehsan Kwong DO  Emergency Medicine Resident    (Please note that portions of thisnote were completed with a voice recognition program.  Efforts were made to edit the dictations but occasionally words are mis-transcribed.)     Ehsan Kwong DO  Resident  07/27/21 1500

## 2021-07-27 NOTE — H&P
[facility]     Department of Internal Medicine - Staff Internal Medicine Teaching Service          ADMISSION NOTE/HISTORY AND PHYSICAL EXAMINATION   Date: 7/27/2021  Patient Name: Karis Chavez  Date of admission: 7/27/2021  6:25 AM  YOB: 1970  PCP: Mandeep Duncan  History Obtained From:  patient    CHIEF COMPLAINT     Chief complaint: Shortness of breath    HISTORY OF PRESENTING ILLNESS     The patient is a pleasant 49 y.o. female presents with a chief complaint of shortness of breath. Patient states that she was sitting down on her couch when she noticed she was having difficulty breathing. At that point she called EMS who brought her to the emergency department. EMS stated she was hypoxic, very tachycardic and hypertensive and had given nitro en route. The past 2 days the patient has had shortness of breath but today it became unbearable stating she could not get any air in. She denies any chest pain. 2 days ago she was at her nieces birthday party and states that she could have been around sick children. She also complains of a sore throat and a productive cough. Was able to examine some of the sputum, and there were speckles of blood mixed in with her sputum. She denies any fever or chills. She has been having diarrhea for the past week. The patient does not follow with any pulmonologist.  She smokes 1 pack/day on average and has been doing so for many years. In the ED, the patient received albuterol and DuoNeb treatments. She was placed on BiPAP upon arrival which improved her hypoxia, but was resting comfortably on 2 L of oxygen via nasal cannula when I saw the patient. She does not wear any oxygen at home. The patient has no other complaints at this time.       Review of Systems:  General ROS: Completed and except as mentioned above were negative   HEENT ROS: Completed and except as mentioned above were negative   Allergy and Immunology ROS: injections. Septum was midline, mucosa was without erythema, exudates or cobblestoning. No thrush was noted. Neck: Supple without thyromegaly. No elevated JVP. Trachea was midline. Respiratory: Chest was symmetrical without dullness to percussion. Breath sounds bilaterally were clear to auscultation. Positive inspiratory and expiratory wheezes. No rhonchi or rales. There is no intercostal retraction or use of accessory muscles. No egophony noted. Cardiovascular: Regular without murmur, clicks, gallops or rubs. Abdomen: Slightly rounded and soft without organomegaly. No rebound, rigidity or guarding was appreciated. Lymphatic: No lymphadenopathy. Musculoskeletal: Normal curvature of the spine. No gross muscle weakness. Extremities:  No lower extremity edema, ulcerations, tenderness, varicosities or erythema. Muscle size, tone and strength are normal.  No involuntary movements are noted. Skin:  Warm and dry. Good color, turgor and pigmentation. No lesions or scars.   No cyanosis or clubbing  Neurological/Psychiatric: The patient's general behavior, level of consciousness, thought content and emotional status is normal.          INVESTIGATIONS     Laboratory Testing:     Recent Results (from the past 24 hour(s))   CBC Auto Differential    Collection Time: 07/27/21  6:47 AM   Result Value Ref Range    WBC 6.1 3.5 - 11.3 k/uL    RBC 4.63 3.95 - 5.11 m/uL    Hemoglobin 13.7 11.9 - 15.1 g/dL    Hematocrit 43.3 36.3 - 47.1 %    MCV 93.5 82.6 - 102.9 fL    MCH 29.6 25.2 - 33.5 pg    MCHC 31.6 28.4 - 34.8 g/dL    RDW 14.5 (H) 11.8 - 14.4 %    Platelets See Reflexed IPF Result 138 - 453 k/uL    MPV NOT REPORTED 8.1 - 13.5 fL    NRBC Automated 0.0 0.0 per 100 WBC    Differential Type NOT REPORTED     WBC Morphology NOT REPORTED     RBC Morphology ANISOCYTOSIS PRESENT     Platelet Estimate NOT REPORTED     Seg Neutrophils 43 36 - 65 %    Lymphocytes 48 (H) 24 - 43 %    Monocytes 8 3 - 12 %    Eosinophils % 1 1 - 4 %    Basophils 0 0 - 2 %    Immature Granulocytes 0 0 %    Segs Absolute 2.61 1.50 - 8.10 k/uL    Absolute Lymph # 2.92 1.10 - 3.70 k/uL    Absolute Mono # 0.46 0.10 - 1.20 k/uL    Absolute Eos # 0.03 0.00 - 0.44 k/uL    Basophils Absolute <0.03 0.00 - 0.20 k/uL    Absolute Immature Granulocyte <0.03 0.00 - 0.30 k/uL   D-DIMER, QUANTITATIVE    Collection Time: 07/27/21  6:47 AM   Result Value Ref Range    D-Dimer, Quant 0.74 mg/L FEU   HCG Qualitative, Serum    Collection Time: 07/27/21  6:47 AM   Result Value Ref Range    hCG Qual NEGATIVE NEGATIVE   Immature Platelet Fraction    Collection Time: 07/27/21  6:47 AM   Result Value Ref Range    Platelet, Immature Fraction NOT REPORTED 1.1 - 10.3 %    Platelet, Fluorescence Platelet clumps present, count appears decreased. 138 - 453 k/uL   SPECIMEN REJECTION    Collection Time: 07/27/21  6:47 AM   Result Value Ref Range    Specimen Source . BLOOD     Ordered Test BMP BNP TROPI     Reason for Rejection Unable to perform testing: Specimen hemolyzed. - NOT REPORTED    COVID-19, Rapid    Collection Time: 07/27/21  6:52 AM    Specimen: Nasopharyngeal Swab   Result Value Ref Range    Specimen Description . NASOPHARYNGEAL SWAB     SARS-CoV-2, Rapid Not Detected Not Detected   Basic Metabolic Panel    Collection Time: 07/27/21  7:27 AM   Result Value Ref Range    Glucose 145 (H) 70 - 99 mg/dL    BUN 3 (L) 6 - 20 mg/dL    CREATININE 0.62 0.50 - 0.90 mg/dL    Bun/Cre Ratio NOT REPORTED 9 - 20    Calcium 8.9 8.6 - 10.4 mg/dL    Sodium 139 135 - 144 mmol/L    Potassium 2.9 (LL) 3.7 - 5.3 mmol/L    Chloride 102 98 - 107 mmol/L    CO2 19 (L) 20 - 31 mmol/L    Anion Gap 18 (H) 9 - 17 mmol/L    GFR Non-African American >60 >60 mL/min    GFR African American >60 >60 mL/min    GFR Comment          GFR Staging NOT REPORTED    Brain Natriuretic Peptide    Collection Time: 07/27/21  7:27 AM   Result Value Ref Range    Pro- <300 pg/mL    BNP Interpretation Pro-BNP Reference Range: Troponin    Collection Time: 07/27/21  7:27 AM   Result Value Ref Range    Troponin, High Sensitivity 22 (H) 0 - 14 ng/L    Troponin T NOT REPORTED <0.03 ng/mL    Troponin Interp NOT REPORTED    Troponin    Collection Time: 07/27/21  8:45 AM   Result Value Ref Range    Troponin, High Sensitivity 35 (H) 0 - 14 ng/L    Troponin T NOT REPORTED <0.03 ng/mL    Troponin Interp NOT REPORTED    Troponin    Collection Time: 07/27/21 11:15 AM   Result Value Ref Range    Troponin, High Sensitivity 33 (H) 0 - 14 ng/L    Troponin T NOT REPORTED <0.03 ng/mL    Troponin Interp NOT REPORTED        Imaging:   XR CHEST PORTABLE    Result Date: 7/27/2021  No focal airspace consolidation or edema/CHF. CT CHEST PULMONARY EMBOLISM W CONTRAST    Result Date: 7/27/2021  1. No evidence of pulmonary embolism or acute pulmonary abnormality. ASSESSMENT & PLAN     ASSESSMENT / PLAN:     IMPRESSION  This is a 49 y.o. female who presented with redness of breath and found to have respiratory failure with hypoxia. Patient admitted to inpatient status for management of acute respiratory failure.     Principal Problem:    Acute respiratory failure with hypoxia (HCC)  Plan:   - Continue with 2 L of oxygen via nasal cannula  - Wear BiPAP when napping and at night  - Monitor O2 saturation  - Respiratory treatments, albuterol, Solu-Medrol and DuoNeb, as needed  - Azithromycin for 5 days  - Home O2 evaluation on discharge    Active Problems:      Hypertensive urgency  Plan:   - Regular blood pressure checks  - Monitor for signs and symptoms of endorgan damage  - Labetalol PRN      Sinus tachycardia  Plan:   - Continue to monitor heart rate  - Cardiology consulted       Hypokalemia  Plan:   - Continue to monitor potassium  - Replace as needed        Smoking addiction  Plan:   - Patient was educated on the importance of smoking cessation  - Nicotine patch as needed        Alcohol abuse with intoxication ([facility] [address]  Plan:   - Patient was educated on the importance of alcohol moderation and cessation  - Thiamine  - Folic acid        Diarrhea due to alcohol intake  Plan:   - Continue to monitor for improvement        Troponin level elevated  Plan:   - Continue to trend troponin levels  - Cardiology consulted        DVT ppx: Lovenox  GI ppx: Not indicated    PT/OT/SW: Consulted  Discharge Planning: In process    Erin Garcia MD  Internal Medicine Resident, PGY-1  [address], [place]  7/27/2021, 2:50 PM   Attending Physician Statement  I have discussed the care of Eduin Barr, including pertinent history and exam findings,  with the resident. I have seen and examined the patient and the key elements of all parts of the encounter have been performed by me. I agree with the assessment, plan and orders as documented by the resident with additions . Treatment plan Discussed with nursing staff in detail , all questions answered . Electronically signed by Sharita Benito MD on   7/28/21 at 10:23 AM EDT    Please note that this chart was generated using voice recognition Dragon dictation software. Although every effort was made to ensure the accuracy of this automated transcription, some errors in transcription may have occurred.

## 2021-07-27 NOTE — ED PROVIDER NOTES
[address] HANDOFF       Handoff taken on the following patient from prior Attending Physician:  Pt Name: Jared Flor  PCP:  Kedar Garner  I was available and discussed any additional care issues that arose and coordinated the management plans with the resident(s) caring for the patient during my duty period. Any areas of disagreement with resident's documentation of care or procedures are noted on the chart. I was personally present for the key portions of any/all procedures during my duty period. I have documented in the chart those procedures where I was not present during the key portions. CHIEF COMPLAINT       Chief Complaint   Patient presents with    Shortness of Breath    Hypertension     HR 160s on ems arrival         CURRENT MEDICATIONS     Previous Medications  Previous Medications    No medications on file       Encounter Medications  Orders Placed This Encounter   Medications    methylPREDNISolone sodium (SOLU-MEDROL) injection 125 mg    albuterol (PROVENTIL) nebulizer solution 2.5 mg     Order Specific Question:   Initiate RT Bronchodilator Protocol     Answer: Yes    ipratropium-albuterol (DUONEB) nebulizer solution 1 ampule     Order Specific Question:   Initiate RT Bronchodilator Protocol     Answer: Yes    ipratropium-albuterol (DUONEB) nebulizer solution 1 ampule     Order Specific Question:   Initiate RT Bronchodilator Protocol     Answer: Yes       ALLERGIES     has No Known Allergies.       RECENT VITALS:   Temp: 98.7 °F (37.1 °C),  Pulse: 102, Resp: 19, BP: (!) 138/93    RADIOLOGY:   XR CHEST PORTABLE    (Results Pending)       LABS:  Labs Reviewed   CBC WITH AUTO DIFFERENTIAL - Abnormal; Notable for the following components:       Result Value    RDW 14.5 (*)     Lymphocytes 48 (*)     All other components within normal limits   COVID-19, RAPID   D-DIMER, QUANTITATIVE   HCG, SERUM, QUALITATIVE   IMMATURE PLATELET FRACTION SPECIMEN REJECTION   TROPONIN   BASIC METABOLIC PANEL   BRAIN NATRIURETIC PEPTIDE   PREVIOUS SPECIMEN   TROPONIN           PLAN/ TASKS OUTSTANDING     This patient is a 49 y.o. Female. 64-year-old female with sudden shortness of breath, tachycardia and wheeze.   Plan for cardiac evaluation and dimer    Dimer elevated at 0.74  Labs pending    (Please note that portions of this note were completed with a voice recognition program.  Efforts were made to edit the dictations but occasionally words are mis-transcribed.)    Clarita Davis MD,, MD  Attending Emergency Physician       Clarita Davis MD  07/27/21 3611

## 2021-07-27 NOTE — PLAN OF CARE
BRONCHOSPASM/BRONCHOCONSTRICTION     [x]         IMPROVE AERATION/BREATH SOUNDS  [x]   ADMINISTER BRONCHODILATOR THERAPY AS APPROPRIATE  [x]   ASSESS BREATH SOUNDS  []   IMPLEMENT AEROSOL/MDI PROTOCOL  [x]   PATIENT EDUCATION AS NEEDED   PATIENT REFUSES TO WEAR BIPAP     [x] Risks and benefits explained to patient   [x] Patient refuses to wear Bipap stating\" I don't need it\"  [x] Patient verbalizes understanding of information presented.

## 2021-07-28 VITALS
DIASTOLIC BLOOD PRESSURE: 93 MMHG | OXYGEN SATURATION: 99 % | HEART RATE: 71 BPM | BODY MASS INDEX: 23.37 KG/M2 | HEIGHT: 62 IN | TEMPERATURE: 98.6 F | WEIGHT: 127 LBS | SYSTOLIC BLOOD PRESSURE: 151 MMHG | RESPIRATION RATE: 18 BRPM

## 2021-07-28 LAB
EKG ATRIAL RATE: 113 BPM
EKG ATRIAL RATE: 79 BPM
EKG P AXIS: 66 DEGREES
EKG P-R INTERVAL: 138 MS
EKG Q-T INTERVAL: 288 MS
EKG Q-T INTERVAL: 456 MS
EKG QRS DURATION: 86 MS
EKG QRS DURATION: 90 MS
EKG QTC CALCULATION (BAZETT): 400 MS
EKG QTC CALCULATION (BAZETT): 522 MS
EKG R AXIS: 52 DEGREES
EKG R AXIS: 57 DEGREES
EKG T AXIS: -154 DEGREES
EKG T AXIS: -25 DEGREES
EKG VENTRICULAR RATE: 116 BPM
EKG VENTRICULAR RATE: 79 BPM

## 2021-07-28 PROCEDURE — 2580000003 HC RX 258

## 2021-07-28 PROCEDURE — 94640 AIRWAY INHALATION TREATMENT: CPT

## 2021-07-28 PROCEDURE — 6360000002 HC RX W HCPCS

## 2021-07-28 PROCEDURE — 99232 SBSQ HOSP IP/OBS MODERATE 35: CPT | Performed by: INTERNAL MEDICINE

## 2021-07-28 PROCEDURE — 6370000000 HC RX 637 (ALT 250 FOR IP)

## 2021-07-28 PROCEDURE — 6370000000 HC RX 637 (ALT 250 FOR IP): Performed by: GENERAL PRACTICE

## 2021-07-28 PROCEDURE — 6370000000 HC RX 637 (ALT 250 FOR IP): Performed by: INTERNAL MEDICINE

## 2021-07-28 RX ORDER — GUAIFENESIN 600 MG/1
600 TABLET, EXTENDED RELEASE ORAL ONCE
Status: COMPLETED | OUTPATIENT
Start: 2021-07-28 | End: 2021-07-28

## 2021-07-28 RX ORDER — AMLODIPINE BESYLATE 5 MG/1
5 TABLET ORAL DAILY
Status: DISCONTINUED | OUTPATIENT
Start: 2021-07-28 | End: 2021-07-28 | Stop reason: HOSPADM

## 2021-07-28 RX ORDER — SODIUM CHLORIDE 9 MG/ML
INJECTION, SOLUTION INTRAVENOUS CONTINUOUS
Status: DISCONTINUED | OUTPATIENT
Start: 2021-07-28 | End: 2021-07-28 | Stop reason: HOSPADM

## 2021-07-28 RX ADMIN — FOLIC ACID 1 MG: 1 TABLET ORAL at 08:30

## 2021-07-28 RX ADMIN — IPRATROPIUM BROMIDE AND ALBUTEROL SULFATE 1 AMPULE: .5; 3 SOLUTION RESPIRATORY (INHALATION) at 08:16

## 2021-07-28 RX ADMIN — SODIUM CHLORIDE: 9 INJECTION, SOLUTION INTRAVENOUS at 07:54

## 2021-07-28 RX ADMIN — METHYLPREDNISOLONE SODIUM SUCCINATE 40 MG: 125 INJECTION, POWDER, FOR SOLUTION INTRAMUSCULAR; INTRAVENOUS at 08:29

## 2021-07-28 RX ADMIN — IPRATROPIUM BROMIDE AND ALBUTEROL SULFATE 1 AMPULE: .5; 3 SOLUTION RESPIRATORY (INHALATION) at 15:16

## 2021-07-28 RX ADMIN — AMLODIPINE BESYLATE 5 MG: 5 TABLET ORAL at 12:25

## 2021-07-28 RX ADMIN — SODIUM CHLORIDE, PRESERVATIVE FREE 10 ML: 5 INJECTION INTRAVENOUS at 00:03

## 2021-07-28 RX ADMIN — ENOXAPARIN SODIUM 40 MG: 40 INJECTION SUBCUTANEOUS at 08:30

## 2021-07-28 RX ADMIN — GUAIFENESIN 600 MG: 600 TABLET, EXTENDED RELEASE ORAL at 05:35

## 2021-07-28 RX ADMIN — Medication 50 MG: at 08:30

## 2021-07-28 RX ADMIN — SODIUM CHLORIDE, PRESERVATIVE FREE 10 ML: 5 INJECTION INTRAVENOUS at 08:30

## 2021-07-28 RX ADMIN — AZITHROMYCIN 250 MG: 250 TABLET, FILM COATED ORAL at 08:30

## 2021-07-28 RX ADMIN — POTASSIUM BICARBONATE 40 MEQ: 782 TABLET, EFFERVESCENT ORAL at 08:30

## 2021-07-28 ASSESSMENT — PAIN SCALES - GENERAL: PAINLEVEL_OUTOF10: 0

## 2021-07-28 NOTE — PROGRESS NOTES
Physical Therapy        Physical Therapy Cancel Note      DATE: 2021    NAME: Lyudmila Lozano  MRN: [id number]   : 1970      Patient not seen this date for Physical Therapy due to:          Physical Therapy Cancel Note      DATE: 2021    NAME: Lyudmila Lozano  MRN: [id number]   : 1970      Patient not seen this date for Physical Therapy due to:    Patient independent with functional mobility. Will defer PT evaluation at this time. Please reorder PT if future needs arise. Pt denies PT needs--states she's fine and doesn't need PT.         Electronically signed by Jennifer Fontaine PT on 2021 at 11:50 AM        Electronically signed by Jennifer Fontaine PT on 2021 at 11:50 AM

## 2021-07-28 NOTE — PLAN OF CARE
Nutrition Problem #1: Unintended weight loss  Intervention: Food and/or Nutrient Delivery: Continue Current Diet, Start Oral Nutrition Supplement  Nutritional Goals: PO intake to meet > 75% of estimated nutrition needs

## 2021-07-28 NOTE — CONSULTS
Attestation signed by      Attending Physician Statement:    I have discussed the care of  Shona Sanchez , including pertinent history and exam findings, with the Cardiology fellow/resident. I have seen and examined the patient and the key elements of all parts of the encounter have been performed by me. I agree with the assessment, plan and orders as documented by the fellow/resident, after I modified exam findings and plan of treatments, and the final version is my approved version of the assessment. Additional Comments:   Short of breath most likely secondary to COPD exacerbation no much improved  Minimal elevated tropes, no chest pains most likely type II  We will get echocardiogram to rule out any structural heart disease  Sinus tachycardia improved secondary to alcohol  Talk in detail regarding quitting smoking and cutting down on alcohol  If echocardiogram normal can be discharged from cardiology  EKG with prolonged QTc 522 ms need to watch electrolytes, avoid any medication that can prolong QTc   Daily EKG till QTc back to normal  DR. [facility] Cardiology    Consult / H&P               Today's Date: 7/28/2021  Patient Name: Shona Sanchez  Date of admission: 7/27/2021  6:25 AM  Patient's age: 49 y.o., 1970  Admission Dx: Shortness of breath [R06.02]    Reason for Consult:  Cardiac evaluation    Requesting Physician: Janet Laird MD    CHIEF COMPLAINT: Shortness of breath    History Obtained From:  patient, electronic medical record    HISTORY OF PRESENT ILLNESS:      The patient is a 49 y.o.  female smoker, alcohol abuse presented to emergency department with progressive worsening shortness of breath even at rest, productive cough for last 1 day. Endorses orthopnea, also report of vomiting due to excess drinking of alcohol recently. Cardiology is consulted for elevated troponin.   Upon arrival patient was found to be hypertensive with BP Intravenous, Q4H PRN  ipratropium-albuterol (DUONEB) nebulizer solution 1 ampule, 1 ampule, Inhalation, TID    Allergies:  Patient has no known allergies. Social History:   reports that she has been smoking. She has been smoking about 1.00 pack per day. She has never used smokeless tobacco. She reports current alcohol use of about 2.0 - 3.0 standard drinks of alcohol per week. She reports current drug use. Frequency: 7.00 times per week. Drug: Marijuana. Family History: family history includes Arrhythmia in her mother; Cancer in her maternal aunt; Depression in her sister; Heart Attack in her mother; Heart Disease in her mother; High Blood Pressure in her mother; High Cholesterol in her mother; Learning Disabilities in her brother; Eduin Combe / Roxie Clementina in her mother; Stroke in her mother. REVIEW OF SYSTEMS:    Constitutional: there has been no unanticipated weight loss. There's been No change in energy level, No change in activity level. Eyes: No visual changes or diplopia. No scleral icterus. ENT: No Headaches  Cardiovascular: Shortness of breath, PND, orthopnea  Respiratory: No previous pulmonary problems, No cough  Gastrointestinal: No abdominal pain. No change in bowel or bladder habits. Genitourinary: No dysuria, trouble voiding, or hematuria. Musculoskeletal:  No gait disturbance, No weakness or joint complaints. Integumentary: No rash or pruritis. Neurological: No headache, diplopia, change in muscle strength, numbness or tingling. No change in gait, balance, coordination, mood, affect, memory, mentation, behavior. Psychiatric: No anxiety, or depression. Endocrine: No temperature intolerance. No excessive thirst, fluid intake, or urination. No tremor. Hematologic/Lymphatic: No abnormal bruising or bleeding, blood clots or swollen lymph nodes. Allergic/Immunologic: No nasal congestion or hives.       PHYSICAL EXAM:      BP (!) 145/82   Pulse 67   Temp 98.1 °F (36.7 °C) (Oral) Resp 16   Ht 5' 2\" (1.575 m)   Wt 125 lb (56.7 kg)   SpO2 100%   BMI 22.86 kg/m²    Constitutional and General Appearance: alert, cooperative, no distress and appears stated age  [de-identified]: PERRL, no cervical lymphadenopathy. No masses palpable. Normal oral mucosa  Respiratory:  Normal excursion and expansion without use of accessory muscles  Resp Auscultation: Good respiratory effort. No for increased work of breathing. On auscultation: crackles on right side  Cardiovascular: The apical impulse is not displaced  Heart tones are crisp and normal. regular S1 and S2.  Jugular venous pulsation Normal  The carotid upstroke is normal in amplitude and contour without delay or bruit  Peripheral pulses are symmetrical and full   Abdomen:   No masses or tenderness  Bowel sounds present  Extremities:   No Cyanosis or Clubbing   Lower extremity edema: No   Skin: Warm and dry  Neurological:  Alert and oriented. Moves all extremities well  No abnormalities of mood, affect, memory, mentation, or behavior are noted    DATA:    Diagnostics:    EKG: normal sinus rhythm  ECHO: not obtained. Stress Test: not obtained. Cardiac Angiography: not obtained. Labs:     CBC:   Recent Labs     07/27/21  0647   WBC 6.1   HGB 13.7   HCT 43.3   PLT See Reflexed IPF Result     BMP:   Recent Labs     07/27/21  0727      K 2.9*   CO2 19*   BUN 3*   CREATININE 0.62   LABGLOM >60   GLUCOSE 145*     FASTING LIPID PANEL:  Lab Results   Component Value Date    HDL 32 06/13/2018       IMPRESSION:      Shortness of breath even at rest likely due to COPD exacerbation  Acute hypoxic respiratory failure  Elevated troponin with flat trend 22>35>33>24 likely due to demand mismatch  Uncontrolled hypertension  Sinus tachycardia due to alcohol withdrawal  Hypokalemia  Tobacco smoker      RECOMMENDATIONS:  We will get an echocardiogram to rule out valvular abnormality and WMA.  Further recommendation after that  Start her on Norvasc 5mg  See above      Discussed with patient and Nurse.     Electronically signed by Davy Jeff MD on 7/28/2021 at 7:19 AM    [facility]      [phone]

## 2021-07-28 NOTE — PLAN OF CARE
Problem:  Activity:  Goal: Ability to tolerate increased activity will improve  Description: Ability to tolerate increased activity will improve  Outcome: Ongoing     Problem: Breathing Pattern - Ineffective:  Goal: Ability to achieve and maintain a regular respiratory rate will improve  Description: Ability to achieve and maintain a regular respiratory rate will improve  Outcome: Ongoing     Problem: Breathing Pattern - Ineffective:  Goal: Ability to achieve and maintain a regular respiratory rate will improve  Description: Ability to achieve and maintain a regular respiratory rate will improve  Outcome: Ongoing

## 2021-07-28 NOTE — PROGRESS NOTES
[facility]  Internal Medicine Teaching Residency Program  Inpatient Daily Progress Note  ______________________________________________________________________________    Patient: Nelia Gaytan  YOB: 1970   RPO:[id number]    Acct: [de-identified]     [facility]  Admit date: 2021  Today's date: 21  Number of days in the hospital: 1    SUBJECTIVE   Admitting Diagnosis: Acute respiratory failure with hypoxia ([address]  CC: Shortness of breath   Pt examined at bedside. Chart & results reviewed. - There were no acute events overnight   - Pt requested Mucinex for cough and stated this has helped her  - Pt was resting comfortably in bed   - K+: 2.9 - replaced   - Pt has been hypertensive - possible starting amlodipine 5mg   - Pt requesting Chantix for smoking cessation   - Pt sound much better than she did yesterday - expiratory wheezes noted   - Pt has no other complaints   - Start amlodipine 5mg   - Start Chantix for the pt as she would like to quit smoking   - Pulmonary rehab at [facility]:  Constitutional:  negative for chills, fevers, sweats  Respiratory:  negative for cough, dyspnea on exertion, hemoptysis, shortness of breath, wheezing  Cardiovascular:  negative for chest pain, chest pressure/discomfort, lower extremity edema, palpitations  Gastrointestinal:  negative for abdominal pain, constipation, diarrhea, nausea, vomiting  Neurological:  negative for dizziness, headache  BRIEF HISTORY     This is a 49 y.o. female who presented with redness of breath and found to have respiratory failure with hypoxia. Patient admitted to inpatient status for management of acute respiratory failure.     OBJECTIVE     Vital Signs:  BP (!) 145/82   Pulse 67   Temp 98.1 °F (36.7 °C) (Oral)   Resp 16   Ht 5' 2\" (1.575 m)   Wt 125 lb (56.7 kg)   SpO2 100%   BMI 22.86 kg/m²     Temp (24hrs), Av.4 °F (36.9 °C), Min:98.1 °F (36.7 °C), Max:98.7 °F (37.1 °C)    No intake/output data recorded. Physical Exam:  Constitutional: This is a well developed, well nourished, 18.5-24.9 - Normal 49y.o. year old female who is alert, oriented, cooperative and in no apparent distress. Head:normocephalic and atraumatic. EENT:  PERRLA. No conjunctival injections. Septum was midline, mucosa was without erythema, exudates or cobblestoning. No thrush was noted. Neck: Supple without thyromegaly. No elevated JVP. Trachea was midline. Respiratory: Chest was symmetrical without dullness to percussion. Breath sounds bilaterally were clear to auscultation. Positive inspiratory and expiratory wheezes much improved since admission. No rhonchi or rales. There is no intercostal retraction or use of accessory muscles. No egophony noted. Cardiovascular: Regular without murmur, clicks, gallops or rubs. Abdomen: Slightly rounded and soft without organomegaly. No rebound, rigidity or guarding was appreciated. Lymphatic: No lymphadenopathy. Musculoskeletal: Normal curvature of the spine. No gross muscle weakness. Extremities:  No lower extremity edema, ulcerations, tenderness, varicosities or erythema. Muscle size, tone and strength are normal.  No involuntary movements are noted. Skin:  Warm and dry. Good color, turgor and pigmentation. No lesions or scars.   No cyanosis or clubbing  Neurological/Psychiatric: The patient's general behavior, level of consciousness, thought content and emotional status is normal.        Medications:  Scheduled Medications:    ondansetron  4 mg Intravenous Once    potassium bicarb-citric acid  40 mEq Oral Daily    sodium chloride flush  5-40 mL Intravenous 2 times per day    enoxaparin  40 mg Subcutaneous Daily    azithromycin  250 mg Oral Daily    methylPREDNISolone  40 mg Intravenous Q12H    thiamine  50 mg Oral Daily    folic acid  1 mg Oral Daily    ipratropium-albuterol  1 ampule Inhalation TID     Continuous Infusions:    sodium chloride       PRN Medicationsalbuterol, 2.5 mg, Q6H PRN  sodium chloride flush, 5-40 mL, PRN  sodium chloride, 25 mL, PRN  ondansetron, 4 mg, Q8H PRN   Or  ondansetron, 4 mg, Q6H PRN  polyethylene glycol, 17 g, Daily PRN  acetaminophen, 650 mg, Q6H PRN   Or  acetaminophen, 650 mg, Q6H PRN  labetalol, 10 mg, Q4H PRN        Diagnostic Labs:  CBC:   Recent Labs     07/27/21  0647   WBC 6.1   RBC 4.63   HGB 13.7   HCT 43.3   MCV 93.5   RDW 14.5*   PLT See Reflexed IPF Result     BMP:   Recent Labs     07/27/21  0727      K 2.9*      CO2 19*   BUN 3*   CREATININE 0.62     BNP: No results for input(s): BNP in the last 72 hours. PT/INR: No results for input(s): PROTIME, INR in the last 72 hours. APTT: No results for input(s): APTT in the last 72 hours. CARDIAC ENZYMES: No results for input(s): CKMB, CKMBINDEX, TROPONINI in the last 72 hours. Invalid input(s): CKTOTAL;3  FASTING LIPID PANEL:  Lab Results   Component Value Date    HDL 32 (L) 06/13/2018     LIVER PROFILE: No results for input(s): AST, ALT, ALB, BILIDIR, BILITOT, ALKPHOS in the last 72 hours. MICROBIOLOGY: No results found for: CULTURE    Imaging:    XR CHEST PORTABLE    Result Date: 7/27/2021  No focal airspace consolidation or edema/CHF. CT CHEST PULMONARY EMBOLISM W CONTRAST    Result Date: 7/27/2021  1. No evidence of pulmonary embolism or acute pulmonary abnormality.        ASSESSMENT & PLAN     ASSESSMENT / PLAN:     Principal Problem:    Acute respiratory failure with hypoxia (HCC)  Plan:   - Continue with 2 L of oxygen via nasal cannula  - Wear BiPAP when napping and at night  - Monitor O2 saturation  - Respiratory treatments, albuterol, Solu-Medrol and DuoNeb, as needed  - Azithromycin for 5 days - day 2  - Home O2 evaluation on discharge  - Pulmonary rehab      Active Problems:      Hypertensive urgency  Plan:   - Start amlodipine 5mg  - Regular blood pressure checks  - Monitor for signs and symptoms of endorgan damage  - Labetalol PRN       Sinus tachycardia  Plan:   - Continue to monitor heart rate  - Cardiology consulted       Hypokalemia  Plan:   - Continue to monitor potassium - 2.9 this morning   - Replace as needed        Smoking addiction  Plan:   - Patient was educated on the importance of smoking cessation  - Nicotine patch as needed  - Pt requested she be started on Chantix        Alcohol abuse with intoxication ([address]  Plan:   - Patient was educated on the importance of alcohol moderation and cessation  - Thiamine  - Folic acid        Diarrhea due to alcohol intake  Plan:   - Continue to monitor for improvement        Troponin level elevated  Plan:   - Continue to trend troponin levels: 33 --> 24  - Cardiology consulted           DVT ppx: Lovenox  GI ppx: Not indicated     PT/OT/SW: Consulted  Discharge Planning: In process    Parrish Carson MD  Internal Medicine Resident, PGY-1  [address], [place]  7/28/2021,   Attending Physician Statement  I have discussed the care of Vivianne Burkitt, including pertinent history and exam findings,  with the resident. I have seen and examined the patient and the key elements of all parts of the encounter have been performed by me. I agree with the assessment, plan and orders as documented by the resident with additions . Treatment plan Discussed with nursing staff in detail , all questions answered . Electronically signed by Ro Apodaca MD on   7/28/21 at 10:24 AM EDT    Please note that this chart was generated using voice recognition Dragon dictation software. Although every effort was made to ensure the accuracy of this automated transcription, some errors in transcription may have occurred.

## 2021-07-28 NOTE — PROGRESS NOTES
Jameson Deb, RCPPatient Assessment complete. Shortness of breath [R06.02] . Vitals:    07/27/21 1920   BP:    Pulse:    Resp: 16   Temp:    SpO2: 100%   .  Patients home meds are   Prior to Admission medications    Not on File     Assessment COPD / SOB  Patient improved WOB  Order for BiPAP but states she wont wear it anymore  PPD smoker  No home o2 or cpap  Albuterol prn at home     RR 16  Breath Sounds: dim wheezes in bases      Bronchodilator assessment at level  2  Hyperinflation assessment at level   Secretion Management assessment at level      [x]    Bronchodilator Assessment  BRONCHODILATOR ASSESSMENT SCORE  Score 0 1 2 3 4 5   Breath Sounds   []  Patient Baseline []  No Wheeze good aeration [x]  Faint, scattered wheezing, good aeration []  Expiratory Wheezing and or moderately diminished []  Insp/Exp wheeze and/or very diminished []  Insp/Exp and/ or marked distress   Respiratory Rate   []  Patient Baseline []  Less than 20 [x]  Less than 20 []  20-25 []  Greater than 25 []  Greater than 25   Peak flow % of Pred or PB [x]  NA   []  Greater than 90%  []  81-90% []  71-80% []  Less than or equal to 70%  or unable to perform []  Unable due to Respiratory Distress   Dyspnea re []  Patient Baseline []  No SOB [x]  No SOB []  SOB on exertion []  SOB min activity []  At rest/acute   e FEV% Predicted       [x]  NA []  Above 69%  []  Unable []  Above 60-69%  []  Unable []  Above 50-59%  []  Unable []  Above 35-49%  []  Unable []  Less than 35%  []  Unable                 []  Hyperinflation Assessment  Score 1 2 3   CXR and Breath Sounds   []  Clear []  No atelectasis  Basilar aeration []  Atelectasis or absent basilar breath sounds   Incentive Spirometry Volume  (Per IBW)   []  Greater than or equal to 15ml/Kg []  less than 15ml/Kg []  less than 15ml/Kg   Surgery within last 2 weeks []  None or general   []  Abdominal or thoracic surgery  []  Abdominal or thoracic   Chronic Pulmonary Historyre []  No []  Yes []  Yes     []  Secretion Management Assessment  Score 1 2 3   Bilateral Breath Sounds   []  Occasional Rhonchi []  Scattered Rhonchi []  Course Rhonchi and/or poor aeration   Sputum    []  Small amount of thin secretions []  Moderate amount of viscous secretions []  Copius, Viscious Yellow/ Secretions   CXR as reported by physician []  clear  []  Unavailable []  Infiltrates and/or consolidation  []  Unavailable []  Mucus Plugging and or lobar consolidation  []  Unavailable   Cough []  Strong, productive cough []  Weak productive cough []  No cough or weak non-productive cough   Kathrin Centeno [facility]  10:02 PM                            FEMALE                                  MALE                            FEV1 Predicted Normal Values                        FEV1 Predicted Normal Values          Age                                     Height in Feet and Inches       Age                                     Height in Feet and Inches       4' 11\" 5' 1\" 5' 3\" 5' 5\" 5' 7\" 5' 9\" 5' 11\" 6' 1\"  4' 11\" 5' 1\" 5' 3\" 5' 5\" 5' 7\" 5' 9\" 5' 11\" 6' 1\"   42 - 45 2.49 2.66 2.84 3.03 3.22 3.42 3.62 3.83 42 - 45 2.82 3.03 3.26 3.49 3.72 3.96 4.22 4.47   46 - 49 2.40 2.57 2.76 2.94 3.14 3.33 3.54 3.75 46 - 49 2.70 2.92 3.14 3.37 3.61 3.85 4.10 4.36   50 - 53 2.31 2.48 2.66 2.85 3.04 3.24 3.45 3.66 50 - 53 2.58 2.80 3.02 3.25 3.49 3.73 3.98 4.24   54 - 57 2.21 2.38 2.57 2.75 2.95 3.14 3.35 3.56 54 - 57 2.46 2.67 2.89 3.12 3.36 3.60 3.85 4.11   58 - 61 2.10 2.28 2.46 2.65 2.84 3.04 3.24 3.45 58 - 61 2.32 2.54 2.76 2.99 3.23 3.47 3.72 3.98   62 - 65 1.99 2.17 2.35 2.54 2.73 2.93 3.13 3.34 62 - 65 2.19 2.40 2.62 2.85 3.09 3.33 3.58 3.84   66 - 69 1.88 2.05 2.23 2.42 2.61 2.81 3.02 3.23 66 - 69 2.04 2.26 2.48 2.71 2.95 3.19 3.44 3.70   70+ 1.82 1.99 2.17 2.36 2.55 2.75 2.95 3.16 70+ 1.97 2.19 2.41 2.64 2.87 3.12 3.37 3.62             Predicted Peak Expiratory Flow Rate                                       Height (in)  Female       Height (in)

## 2021-07-29 ENCOUNTER — APPOINTMENT (OUTPATIENT)
Dept: GENERAL RADIOLOGY | Age: 51
DRG: 140 | End: 2021-07-29
Payer: MEDICARE

## 2021-07-29 ENCOUNTER — HOSPITAL ENCOUNTER (INPATIENT)
Age: 51
LOS: 1 days | Discharge: HOME | DRG: 140 | End: 2021-07-30
Attending: EMERGENCY MEDICINE | Admitting: INTERNAL MEDICINE
Payer: MEDICARE

## 2021-07-29 DIAGNOSIS — R94.31 ST SEGMENT CHANGES ON ELECTROCARDIOGRAM: ICD-10-CM

## 2021-07-29 DIAGNOSIS — G47.30 SLEEP APNEA, UNSPECIFIED TYPE: ICD-10-CM

## 2021-07-29 DIAGNOSIS — J44.1 COPD EXACERBATION (HCC): Primary | ICD-10-CM

## 2021-07-29 DIAGNOSIS — J44.1 CHRONIC OBSTRUCTIVE PULMONARY DISEASE WITH ACUTE EXACERBATION (HCC): ICD-10-CM

## 2021-07-29 DIAGNOSIS — R77.8 TROPONIN LEVEL ELEVATED: ICD-10-CM

## 2021-07-29 DIAGNOSIS — R07.9 CHEST PAIN WITH HIGH RISK FOR CARDIAC ETIOLOGY: ICD-10-CM

## 2021-07-29 DIAGNOSIS — E87.6 HYPOKALEMIA: ICD-10-CM

## 2021-07-29 PROBLEM — J44.9 COPD (CHRONIC OBSTRUCTIVE PULMONARY DISEASE) (HCC): Status: ACTIVE | Noted: 2021-07-29

## 2021-07-29 LAB
ABSOLUTE EOS #: 0 K/UL (ref 0–0.4)
ABSOLUTE IMMATURE GRANULOCYTE: 0 K/UL (ref 0–0.3)
ABSOLUTE LYMPH #: 7.13 K/UL (ref 1–4.8)
ABSOLUTE MONO #: 0.93 K/UL (ref 0.1–0.8)
ALBUMIN SERPL-MCNC: 3.6 G/DL (ref 3.5–5.2)
ALBUMIN/GLOBULIN RATIO: 1.3 (ref 1–2.5)
ALP BLD-CCNC: 73 U/L (ref 35–104)
ALT SERPL-CCNC: 26 U/L (ref 5–33)
ANION GAP SERPL CALCULATED.3IONS-SCNC: 14 MMOL/L (ref 9–17)
AST SERPL-CCNC: 29 U/L
BASOPHILS # BLD: 0 % (ref 0–2)
BASOPHILS ABSOLUTE: 0 K/UL (ref 0–0.2)
BILIRUB SERPL-MCNC: 0.34 MG/DL (ref 0.3–1.2)
BILIRUBIN DIRECT: 0.12 MG/DL
BILIRUBIN, INDIRECT: 0.22 MG/DL (ref 0–1)
BNP INTERPRETATION: ABNORMAL
BUN BLDV-MCNC: 10 MG/DL (ref 6–20)
BUN/CREAT BLD: NORMAL (ref 9–20)
CALCIUM SERPL-MCNC: 9.4 MG/DL (ref 8.6–10.4)
CHLORIDE BLD-SCNC: 100 MMOL/L (ref 98–107)
CO2: 22 MMOL/L (ref 20–31)
CREAT SERPL-MCNC: 0.6 MG/DL (ref 0.5–0.9)
DIFFERENTIAL TYPE: ABNORMAL
EKG ATRIAL RATE: 277 BPM
EKG ATRIAL RATE: 90 BPM
EKG P AXIS: 61 DEGREES
EKG P-R INTERVAL: 140 MS
EKG Q-T INTERVAL: 376 MS
EKG Q-T INTERVAL: 480 MS
EKG QRS DURATION: 88 MS
EKG QRS DURATION: 88 MS
EKG QTC CALCULATION (BAZETT): 459 MS
EKG QTC CALCULATION (BAZETT): 518 MS
EKG R AXIS: 64 DEGREES
EKG R AXIS: 68 DEGREES
EKG T AXIS: -70 DEGREES
EKG T AXIS: -90 DEGREES
EKG VENTRICULAR RATE: 70 BPM
EKG VENTRICULAR RATE: 90 BPM
EOSINOPHILS RELATIVE PERCENT: 0 % (ref 1–4)
GFR AFRICAN AMERICAN: >60 ML/MIN
GFR NON-AFRICAN AMERICAN: >60 ML/MIN
GFR SERPL CREATININE-BSD FRML MDRD: NORMAL ML/MIN/{1.73_M2}
GFR SERPL CREATININE-BSD FRML MDRD: NORMAL ML/MIN/{1.73_M2}
GLOBULIN: NORMAL G/DL (ref 1.5–3.8)
GLUCOSE BLD-MCNC: 97 MG/DL (ref 70–99)
HCT VFR BLD CALC: 40.5 % (ref 36.3–47.1)
HCT VFR BLD CALC: 43.2 % (ref 36.3–47.1)
HEMOGLOBIN: 13 G/DL (ref 11.9–15.1)
HEMOGLOBIN: 13.6 G/DL (ref 11.9–15.1)
IMMATURE GRANULOCYTES: 0 %
LYMPHOCYTES # BLD: 46 % (ref 24–44)
MCH RBC QN AUTO: 29.4 PG (ref 25.2–33.5)
MCH RBC QN AUTO: 29.7 PG (ref 25.2–33.5)
MCHC RBC AUTO-ENTMCNC: 31.5 G/DL (ref 28.4–34.8)
MCHC RBC AUTO-ENTMCNC: 32.1 G/DL (ref 28.4–34.8)
MCV RBC AUTO: 92.5 FL (ref 82.6–102.9)
MCV RBC AUTO: 93.3 FL (ref 82.6–102.9)
MONOCYTES # BLD: 6 % (ref 1–7)
MORPHOLOGY: NORMAL
NRBC AUTOMATED: 0 PER 100 WBC
NRBC AUTOMATED: 0 PER 100 WBC
PARTIAL THROMBOPLASTIN TIME: 113.5 SEC (ref 20.5–30.5)
PDW BLD-RTO: 14.1 % (ref 11.8–14.4)
PDW BLD-RTO: 14.2 % (ref 11.8–14.4)
PLATELET # BLD: 308 K/UL (ref 138–453)
PLATELET # BLD: 333 K/UL (ref 138–453)
PLATELET ESTIMATE: ABNORMAL
PMV BLD AUTO: 10 FL (ref 8.1–13.5)
PMV BLD AUTO: 9.8 FL (ref 8.1–13.5)
POTASSIUM SERPL-SCNC: 4.1 MMOL/L (ref 3.7–5.3)
PRO-BNP: 710 PG/ML
RBC # BLD: 4.38 M/UL (ref 3.95–5.11)
RBC # BLD: 4.63 M/UL (ref 3.95–5.11)
RBC # BLD: ABNORMAL 10*6/UL
SARS-COV-2, RAPID: NOT DETECTED
SEG NEUTROPHILS: 48 % (ref 36–66)
SEGMENTED NEUTROPHILS ABSOLUTE COUNT: 7.44 K/UL (ref 1.8–7.7)
SODIUM BLD-SCNC: 136 MMOL/L (ref 135–144)
SPECIMEN DESCRIPTION: NORMAL
TOTAL PROTEIN: 6.4 G/DL (ref 6.4–8.3)
TROPONIN INTERP: NORMAL
TROPONIN T: NORMAL NG/ML
TROPONIN, HIGH SENSITIVITY: 11 NG/L (ref 0–14)
WBC # BLD: 14.2 K/UL (ref 3.5–11.3)
WBC # BLD: 15.5 K/UL (ref 3.5–11.3)
WBC # BLD: ABNORMAL 10*3/UL

## 2021-07-29 PROCEDURE — 93005 ELECTROCARDIOGRAM TRACING: CPT | Performed by: HEALTH CARE PROVIDER

## 2021-07-29 PROCEDURE — 93005 ELECTROCARDIOGRAM TRACING: CPT | Performed by: EMERGENCY MEDICINE

## 2021-07-29 PROCEDURE — 85025 COMPLETE CBC W/AUTO DIFF WBC: CPT

## 2021-07-29 PROCEDURE — 4A023N7 MEASUREMENT OF CARDIAC SAMPLING AND PRESSURE, LEFT HEART, PERCUTANEOUS APPROACH: ICD-10-PCS | Performed by: INTERNAL MEDICINE

## 2021-07-29 PROCEDURE — 83880 ASSAY OF NATRIURETIC PEPTIDE: CPT

## 2021-07-29 PROCEDURE — 84484 ASSAY OF TROPONIN QUANT: CPT

## 2021-07-29 PROCEDURE — 6360000002 HC RX W HCPCS: Performed by: HEALTH CARE PROVIDER

## 2021-07-29 PROCEDURE — C1894 INTRO/SHEATH, NON-LASER: HCPCS

## 2021-07-29 PROCEDURE — 80048 BASIC METABOLIC PNL TOTAL CA: CPT

## 2021-07-29 PROCEDURE — 6360000004 HC RX CONTRAST MEDICATION

## 2021-07-29 PROCEDURE — 2500000003 HC RX 250 WO HCPCS

## 2021-07-29 PROCEDURE — 93010 ELECTROCARDIOGRAM REPORT: CPT | Performed by: INTERNAL MEDICINE

## 2021-07-29 PROCEDURE — 6370000000 HC RX 637 (ALT 250 FOR IP)

## 2021-07-29 PROCEDURE — 99283 EMERGENCY DEPT VISIT LOW MDM: CPT

## 2021-07-29 PROCEDURE — 80076 HEPATIC FUNCTION PANEL: CPT

## 2021-07-29 PROCEDURE — 6370000000 HC RX 637 (ALT 250 FOR IP): Performed by: HEALTH CARE PROVIDER

## 2021-07-29 PROCEDURE — 6360000002 HC RX W HCPCS

## 2021-07-29 PROCEDURE — 87040 BLOOD CULTURE FOR BACTERIA: CPT

## 2021-07-29 PROCEDURE — 99223 1ST HOSP IP/OBS HIGH 75: CPT | Performed by: INTERNAL MEDICINE

## 2021-07-29 PROCEDURE — 2709999900 HC NON-CHARGEABLE SUPPLY

## 2021-07-29 PROCEDURE — 6360000002 HC RX W HCPCS: Performed by: STUDENT IN AN ORGANIZED HEALTH CARE EDUCATION/TRAINING PROGRAM

## 2021-07-29 PROCEDURE — 94761 N-INVAS EAR/PLS OXIMETRY MLT: CPT

## 2021-07-29 PROCEDURE — 71045 X-RAY EXAM CHEST 1 VIEW: CPT

## 2021-07-29 PROCEDURE — 94640 AIRWAY INHALATION TREATMENT: CPT

## 2021-07-29 PROCEDURE — 6370000000 HC RX 637 (ALT 250 FOR IP): Performed by: STUDENT IN AN ORGANIZED HEALTH CARE EDUCATION/TRAINING PROGRAM

## 2021-07-29 PROCEDURE — C1887 CATHETER, GUIDING: HCPCS

## 2021-07-29 PROCEDURE — 85730 THROMBOPLASTIN TIME PARTIAL: CPT

## 2021-07-29 PROCEDURE — 93458 L HRT ARTERY/VENTRICLE ANGIO: CPT | Performed by: INTERNAL MEDICINE

## 2021-07-29 PROCEDURE — 2580000003 HC RX 258

## 2021-07-29 PROCEDURE — 87635 SARS-COV-2 COVID-19 AMP PRB: CPT

## 2021-07-29 PROCEDURE — C1760 CLOSURE DEV, VASC: HCPCS

## 2021-07-29 PROCEDURE — 85027 COMPLETE CBC AUTOMATED: CPT

## 2021-07-29 PROCEDURE — 2060000000 HC ICU INTERMEDIATE R&B

## 2021-07-29 PROCEDURE — B2111ZZ FLUOROSCOPY OF MULTIPLE CORONARY ARTERIES USING LOW OSMOLAR CONTRAST: ICD-10-PCS | Performed by: INTERNAL MEDICINE

## 2021-07-29 PROCEDURE — 36415 COLL VENOUS BLD VENIPUNCTURE: CPT

## 2021-07-29 PROCEDURE — B2151ZZ FLUOROSCOPY OF LEFT HEART USING LOW OSMOLAR CONTRAST: ICD-10-PCS | Performed by: INTERNAL MEDICINE

## 2021-07-29 PROCEDURE — 96374 THER/PROPH/DIAG INJ IV PUSH: CPT

## 2021-07-29 RX ORDER — ACETAMINOPHEN 650 MG/1
650 SUPPOSITORY RECTAL EVERY 6 HOURS PRN
Status: DISCONTINUED | OUTPATIENT
Start: 2021-07-29 | End: 2021-07-30 | Stop reason: HOSPADM

## 2021-07-29 RX ORDER — ALBUTEROL SULFATE 2.5 MG/3ML
2.5 SOLUTION RESPIRATORY (INHALATION)
Status: DISCONTINUED | OUTPATIENT
Start: 2021-07-29 | End: 2021-07-30 | Stop reason: HOSPADM

## 2021-07-29 RX ORDER — LISINOPRIL 10 MG/1
10 TABLET ORAL DAILY
Status: DISCONTINUED | OUTPATIENT
Start: 2021-07-29 | End: 2021-07-29

## 2021-07-29 RX ORDER — HEPARIN SODIUM 1000 [USP'U]/ML
30 INJECTION, SOLUTION INTRAVENOUS; SUBCUTANEOUS PRN
Status: DISCONTINUED | OUTPATIENT
Start: 2021-07-29 | End: 2021-07-29

## 2021-07-29 RX ORDER — HEPARIN SODIUM 5000 [USP'U]/ML
5000 INJECTION, SOLUTION INTRAVENOUS; SUBCUTANEOUS EVERY 8 HOURS SCHEDULED
Status: DISCONTINUED | OUTPATIENT
Start: 2021-07-29 | End: 2021-07-30 | Stop reason: HOSPADM

## 2021-07-29 RX ORDER — HEPARIN SODIUM 1000 [USP'U]/ML
60 INJECTION, SOLUTION INTRAVENOUS; SUBCUTANEOUS ONCE
Status: COMPLETED | OUTPATIENT
Start: 2021-07-29 | End: 2021-07-29

## 2021-07-29 RX ORDER — ASPIRIN 81 MG/1
324 TABLET, CHEWABLE ORAL ONCE
Status: COMPLETED | OUTPATIENT
Start: 2021-07-29 | End: 2021-07-29

## 2021-07-29 RX ORDER — SODIUM CHLORIDE 0.9 % (FLUSH) 0.9 %
5-40 SYRINGE (ML) INJECTION PRN
Status: DISCONTINUED | OUTPATIENT
Start: 2021-07-29 | End: 2021-07-30 | Stop reason: HOSPADM

## 2021-07-29 RX ORDER — HEPARIN SODIUM 10000 [USP'U]/100ML
5-30 INJECTION, SOLUTION INTRAVENOUS CONTINUOUS
Status: DISCONTINUED | OUTPATIENT
Start: 2021-07-29 | End: 2021-07-29

## 2021-07-29 RX ORDER — POTASSIUM CHLORIDE 20 MEQ/1
40 TABLET, EXTENDED RELEASE ORAL PRN
Status: DISCONTINUED | OUTPATIENT
Start: 2021-07-29 | End: 2021-07-30 | Stop reason: HOSPADM

## 2021-07-29 RX ORDER — LISINOPRIL AND HYDROCHLOROTHIAZIDE 12.5; 1 MG/1; MG/1
1 TABLET ORAL DAILY
Status: DISCONTINUED | OUTPATIENT
Start: 2021-07-29 | End: 2021-07-29

## 2021-07-29 RX ORDER — ONDANSETRON 2 MG/ML
4 INJECTION INTRAMUSCULAR; INTRAVENOUS EVERY 6 HOURS PRN
Status: DISCONTINUED | OUTPATIENT
Start: 2021-07-29 | End: 2021-07-29

## 2021-07-29 RX ORDER — SODIUM CHLORIDE 9 MG/ML
25 INJECTION, SOLUTION INTRAVENOUS PRN
Status: CANCELLED | OUTPATIENT
Start: 2021-07-29

## 2021-07-29 RX ORDER — ACETAMINOPHEN 325 MG/1
650 TABLET ORAL EVERY 6 HOURS PRN
Status: DISCONTINUED | OUTPATIENT
Start: 2021-07-29 | End: 2021-07-30 | Stop reason: HOSPADM

## 2021-07-29 RX ORDER — PREDNISONE 1 MG/1
5 TABLET ORAL DAILY
Status: DISCONTINUED | OUTPATIENT
Start: 2021-08-07 | End: 2021-07-30 | Stop reason: HOSPADM

## 2021-07-29 RX ORDER — GUAIFENESIN 600 MG/1
600 TABLET, EXTENDED RELEASE ORAL 2 TIMES DAILY
Status: DISCONTINUED | OUTPATIENT
Start: 2021-07-29 | End: 2021-07-30 | Stop reason: HOSPADM

## 2021-07-29 RX ORDER — AMLODIPINE BESYLATE 10 MG/1
10 TABLET ORAL DAILY
Status: DISCONTINUED | OUTPATIENT
Start: 2021-07-29 | End: 2021-07-30 | Stop reason: HOSPADM

## 2021-07-29 RX ORDER — POLYETHYLENE GLYCOL 3350 17 G/17G
17 POWDER, FOR SOLUTION ORAL DAILY PRN
Status: DISCONTINUED | OUTPATIENT
Start: 2021-07-29 | End: 2021-07-30 | Stop reason: HOSPADM

## 2021-07-29 RX ORDER — IPRATROPIUM BROMIDE AND ALBUTEROL SULFATE 2.5; .5 MG/3ML; MG/3ML
1 SOLUTION RESPIRATORY (INHALATION) EVERY 4 HOURS PRN
Status: DISCONTINUED | OUTPATIENT
Start: 2021-07-29 | End: 2021-07-30 | Stop reason: HOSPADM

## 2021-07-29 RX ORDER — PREDNISONE 10 MG/1
10 TABLET ORAL DAILY
Status: DISCONTINUED | OUTPATIENT
Start: 2021-08-04 | End: 2021-07-30 | Stop reason: HOSPADM

## 2021-07-29 RX ORDER — POTASSIUM CHLORIDE 7.45 MG/ML
10 INJECTION INTRAVENOUS PRN
Status: DISCONTINUED | OUTPATIENT
Start: 2021-07-29 | End: 2021-07-30 | Stop reason: HOSPADM

## 2021-07-29 RX ORDER — SODIUM CHLORIDE 0.9 % (FLUSH) 0.9 %
5-40 SYRINGE (ML) INJECTION PRN
Status: CANCELLED | OUTPATIENT
Start: 2021-07-29

## 2021-07-29 RX ORDER — BENZONATATE 100 MG/1
100 CAPSULE ORAL 3 TIMES DAILY PRN
Status: DISCONTINUED | OUTPATIENT
Start: 2021-07-29 | End: 2021-07-30 | Stop reason: HOSPADM

## 2021-07-29 RX ORDER — SODIUM CHLORIDE 9 MG/ML
25 INJECTION, SOLUTION INTRAVENOUS PRN
Status: DISCONTINUED | OUTPATIENT
Start: 2021-07-29 | End: 2021-07-30 | Stop reason: HOSPADM

## 2021-07-29 RX ORDER — ONDANSETRON 4 MG/1
4 TABLET, ORALLY DISINTEGRATING ORAL EVERY 8 HOURS PRN
Status: DISCONTINUED | OUTPATIENT
Start: 2021-07-29 | End: 2021-07-29

## 2021-07-29 RX ORDER — HEPARIN SODIUM 1000 [USP'U]/ML
60 INJECTION, SOLUTION INTRAVENOUS; SUBCUTANEOUS PRN
Status: DISCONTINUED | OUTPATIENT
Start: 2021-07-29 | End: 2021-07-29

## 2021-07-29 RX ORDER — PREDNISONE 20 MG/1
20 TABLET ORAL DAILY
Status: DISCONTINUED | OUTPATIENT
Start: 2021-07-29 | End: 2021-07-30 | Stop reason: HOSPADM

## 2021-07-29 RX ORDER — LISINOPRIL AND HYDROCHLOROTHIAZIDE 12.5; 1 MG/1; MG/1
1 TABLET ORAL ONCE
Status: COMPLETED | OUTPATIENT
Start: 2021-07-29 | End: 2021-07-29

## 2021-07-29 RX ORDER — SODIUM CHLORIDE 0.9 % (FLUSH) 0.9 %
5-40 SYRINGE (ML) INJECTION EVERY 12 HOURS SCHEDULED
Status: DISCONTINUED | OUTPATIENT
Start: 2021-07-29 | End: 2021-07-30 | Stop reason: HOSPADM

## 2021-07-29 RX ORDER — LISINOPRIL 10 MG/1
10 TABLET ORAL ONCE
Status: DISCONTINUED | OUTPATIENT
Start: 2021-07-29 | End: 2021-07-29

## 2021-07-29 RX ORDER — FAMOTIDINE 20 MG/1
20 TABLET, FILM COATED ORAL 2 TIMES DAILY
Status: DISCONTINUED | OUTPATIENT
Start: 2021-07-29 | End: 2021-07-30 | Stop reason: HOSPADM

## 2021-07-29 RX ORDER — LISINOPRIL AND HYDROCHLOROTHIAZIDE 12.5; 1 MG/1; MG/1
2 TABLET ORAL DAILY
Status: DISCONTINUED | OUTPATIENT
Start: 2021-07-30 | End: 2021-07-30 | Stop reason: HOSPADM

## 2021-07-29 RX ORDER — SODIUM CHLORIDE 0.9 % (FLUSH) 0.9 %
5-40 SYRINGE (ML) INJECTION EVERY 12 HOURS SCHEDULED
Status: CANCELLED | OUTPATIENT
Start: 2021-07-29

## 2021-07-29 RX ADMIN — SODIUM CHLORIDE, PRESERVATIVE FREE 10 ML: 5 INJECTION INTRAVENOUS at 21:01

## 2021-07-29 RX ADMIN — SODIUM CHLORIDE, PRESERVATIVE FREE 10 ML: 5 INJECTION INTRAVENOUS at 10:59

## 2021-07-29 RX ADMIN — AMLODIPINE BESYLATE 10 MG: 10 TABLET ORAL at 15:23

## 2021-07-29 RX ADMIN — ALBUTEROL SULFATE 2.5 MG: 2.5 SOLUTION RESPIRATORY (INHALATION) at 12:19

## 2021-07-29 RX ADMIN — FAMOTIDINE 20 MG: 20 TABLET, FILM COATED ORAL at 10:57

## 2021-07-29 RX ADMIN — FAMOTIDINE 20 MG: 20 TABLET, FILM COATED ORAL at 21:01

## 2021-07-29 RX ADMIN — IPRATROPIUM BROMIDE AND ALBUTEROL SULFATE 1 AMPULE: .5; 3 SOLUTION RESPIRATORY (INHALATION) at 10:31

## 2021-07-29 RX ADMIN — ASPIRIN 324 MG: 81 TABLET ORAL at 04:31

## 2021-07-29 RX ADMIN — HEPARIN SODIUM AND DEXTROSE 12 UNITS/KG/HR: 10000; 5 INJECTION INTRAVENOUS at 05:16

## 2021-07-29 RX ADMIN — PREDNISONE 20 MG: 20 TABLET ORAL at 10:57

## 2021-07-29 RX ADMIN — HEPARIN SODIUM 3260 UNITS: 1000 INJECTION, SOLUTION INTRAVENOUS; SUBCUTANEOUS at 05:13

## 2021-07-29 RX ADMIN — BENZONATATE 100 MG: 100 CAPSULE ORAL at 10:57

## 2021-07-29 RX ADMIN — GUAIFENESIN 600 MG: 600 TABLET, EXTENDED RELEASE ORAL at 21:01

## 2021-07-29 RX ADMIN — LISINOPRIL AND HYDROCHLOROTHIAZIDE 1 TABLET: 12.5; 1 TABLET ORAL at 15:23

## 2021-07-29 RX ADMIN — GUAIFENESIN 600 MG: 600 TABLET, EXTENDED RELEASE ORAL at 10:56

## 2021-07-29 RX ADMIN — LISINOPRIL AND HYDROCHLOROTHIAZIDE 1 TABLET: 12.5; 1 TABLET ORAL at 10:57

## 2021-07-29 RX ADMIN — HEPARIN SODIUM 5000 UNITS: 5000 INJECTION INTRAVENOUS; SUBCUTANEOUS at 21:38

## 2021-07-29 ASSESSMENT — ENCOUNTER SYMPTOMS
CHEST TIGHTNESS: 0
COLOR CHANGE: 0
ABDOMINAL DISTENTION: 0
COUGH: 1
APNEA: 0
SORE THROAT: 0
BACK PAIN: 0
ABDOMINAL PAIN: 0
SHORTNESS OF BREATH: 1
WHEEZING: 0

## 2021-07-29 ASSESSMENT — PAIN SCALES - GENERAL: PAINLEVEL_OUTOF10: 0

## 2021-07-29 NOTE — PROGRESS NOTES
LOOB Russoatient Assessment complete. COPD (chronic obstructive pulmonary disease) ([facility] [J44.9] . Vitals:    07/29/21 1034   BP:    Pulse:    Resp: 23   Temp:    SpO2: 94%   .  Patients home meds are   Prior to Admission medications    Not on File       Assessment   Pt states she does not take at home medication for breathing  Pt states she does not have an at home BiPAP  Pt states she does not wear oxygen at home       RR 12  Breath Sounds: clear/diminished      Bronchodilator assessment at level  1     []    Bronchodilator Assessment  BRONCHODILATOR ASSESSMENT SCORE  Score 0 1 2 3 4 5   Breath Sounds   []  Patient Baseline [x]  No Wheeze good aeration []  Faint, scattered wheezing, good aeration []  Expiratory Wheezing and or moderately diminished []  Insp/Exp wheeze and/or very diminished []  Insp/Exp and/ or marked distress   Respiratory Rate   []  Patient Baseline [x]  Less than 20 []  Less than 20 []  20-25 []  Greater than 25 []  Greater than 25   Peak flow % of Pred or PB [x]  NA   []  Greater than 90%  []  81-90% []  71-80% []  Less than or equal to 70%  or unable to perform []  Unable due to Respiratory Distress   Dyspnea re []  Patient Baseline [x]  No SOB []  No SOB []  SOB on exertion []  SOB min activity []  At rest/acute   e FEV% Predicted       [x]  NA []  Above 69%  []  Unable []  Above 60-69%  []  Unable []  Above 50-59%  []  Unable []  Above 35-49%  []  Unable []  Less than 35%  []  Unable          Estrella Boggs RCP  10:41 AM

## 2021-07-29 NOTE — Clinical Note
Patient Class: Inpatient [101]   REQUIRED: Diagnosis: COPD (chronic obstructive pulmonary disease) ([facility] [[id number]]   Estimated Length of Stay: Estimated stay of less than 2 midnights   Admitting Provider: Myles Moody [[id number]]   Telemetry/Cardiac Monitoring Required?: Yes

## 2021-07-29 NOTE — H&P
[facility] [place] [facility]   Consult               Today's Date: 7/29/2021  Patient Name: Марина Saul  Date of admission: 7/29/2021  3:58 AM  Patient's age: 49 y.o., 1970  Admission Dx: No admission diagnoses are documented for this encounter. Reason for Admission:  Inferior STEMI    CHIEF COMPLAINT:    Chief Complaint   Patient presents with    Shortness of Breath       History Obtained From:  patient, electronic medical record    HISTORY OF PRESENT ILLNESS:      The patient is a 49 y.o.  female who is admitted to the hospital for anterior STEMI. She has a past medical history of essential hypertension and a smoker. She left AMA yesterday from the hospital.  She presented this time to the hospital with shortness of breath. EKG was done and it showed anterior STEMI. Patient was taken emergently to the Cath Lab. Past Medical History:   has no past medical history on file. Past Surgical History:   has a past surgical history that includes Carpal tunnel release. Home Medications:    Prior to Admission medications    Not on File        Current Facility-Administered Medications: heparin (porcine) injection 3,260 Units, 60 Units/kg, Intravenous, PRN  heparin (porcine) injection 1,630 Units, 30 Units/kg, Intravenous, PRN  heparin 25,000 units in dextrose 5% 250 mL (premix) infusion, 5-30 Units/kg/hr, Intravenous, Continuous    Allergies:  Patient has no known allergies. Social History:   reports that she has been smoking. She has been smoking about 1.00 pack per day. She has never used smokeless tobacco. She reports current alcohol use of about 2.0 - 3.0 standard drinks of alcohol per week. She reports current drug use. Frequency: 7.00 times per week. Drug: Marijuana. Family History: family history includes Arrhythmia in her mother; Cancer in her maternal aunt; Depression in her sister;  Heart Attack in her mother; Heart Disease in her mother; High Blood Pressure in her mother; High Cholesterol in her mother; Learning Disabilities in her brother; Eduin Combe / [place] in her mother; Stroke in her mother. REVIEW OF SYSTEMS:      · Constitutional: there has been no unanticipated weight loss. · Eyes: No visual changes or diplopia. · ENT: No Headaches  · Cardiovascular:  Remaining as above  · Respiratory: No cough  · Gastrointestinal: No abdominal pain. No change in bowel or bladder habits. · Genitourinary: No dysuria, trouble voiding, or hematuria. · Neurological: No headache. PHYSICAL EXAM:      /84   Pulse 72   Temp 99 °F (37.2 °C)   Resp 16   Ht 5' 2\" (1.575 m)   Wt 120 lb (54.4 kg)   SpO2 100%   BMI 21.95 kg/m²    No intake or output data in the 24 hours ending 07/29/21 0542    Constitutional and General Appearance:   alert, cooperative, no distress and appears stated age  HEENT:  · PERRL, EOMI  Respiratory:  · Normal excursion and expansion without use of accessory muscles  · Resp Auscultation:  Good respiratory effort. No for increased work of breathing. On auscultation: clear to auscultation bilaterally  Cardiovascular:  · Regular rate and rhythm  · S1/S2  · No murmurs  · The apical impulse is not displaced  Abdomen:  · Soft  · Bowel sounds present  · Non-tender to palpation  Extremities:  · No cyanosis or clubbing  · Lower extremity edema: No  Skin:  · Warm and dry  Neurological:  · Alert and oriented.   · Moves all extremities well        DATA:    Diagnostics:    EKG: inferior STEMI  ECHO:  Pending  Stress Test: Not applicable   Cardiac Angiography: Pending    Labs:     CBC:   Recent Labs     07/29/21 0425 07/29/21  0522   WBC 15.5* 14.2*   HGB 13.6 13.0   HCT 43.2 40.5    308     BMP:   Recent Labs     07/27/21 0727 07/29/21 0425    136   K 2.9* 4.1   CO2 19* 22   BUN 3* 10   CREATININE 0.62 0.60   LABGLOM >60 >60   GLUCOSE 145* 97     Pro-BNP:    Recent Labs     07/27/21 0727 07/29/21 0425   PROBNP 202 710*       Recent Labs     07/27/21  1115 07/27/21  1608 07/29/21  0425   TROPONINT NOT REPORTED NOT REPORTED NOT REPORTED       FASTING LIPID PANEL:  Lab Results   Component Value Date    HDL 32 06/13/2018     Patient's Active Problem List  Active Problems:    * No active hospital problems. *  Resolved Problems:    * No resolved hospital problems. *        IMPRESSION:    1. Anterior STEMI  2. Smoker  3. COPD  4. Essential hypertension      RECOMMENDATIONS:    1. Emergent left heart cath      Pre Procedure Conscious Sedation Data:     ASA Class:                  [] I [] II [] III [x] IV     Mallampati Class:       [] I [x] II [] III [] IV        Discussed with patient and Nurse. Damaso Cisneros MD, M.D. Fellow, [address] [place] [address]      Please note that part of this chart were generated using voice recognition  dictation software. Although every effort was made to ensure the accuracy of this automated transcription, some errors in transcription may have occurred. Attestation signed by      Attending Physician Statement:    I have discussed the care of  Lyudmila Lozano , including pertinent history and exam findings, with the Cardiology fellow/resident. I have seen and examined the patient and the key elements of all parts of the encounter have been performed by me. I agree with the assessment, plan and orders as documented by the fellow/resident, after I modified exam findings and plan of treatments, and the final version is my approved version of the assessment.      Additional Comments:

## 2021-07-29 NOTE — ED NOTES
Patient presents to ED for cough that she becomes short of breath with, c/o \"tickle in throat\". Patient currently denies all pain. Patient alert and oriented x 3, resp e/u, skin PWD, speaking in full sentences, NADN.      Vin Mcintosh RN  07/29/21 [id number]

## 2021-07-29 NOTE — ED PROVIDER NOTES
[facility]     Emergency Department     Faculty Attestation    I performed a history and physical examination of the patient and discussed management with the resident. I have reviewed and agree with the residents findings including all diagnostic interpretations, and treatment plans as written. Any areas of disagreement are noted on the chart. I was personally present for the key portions of any procedures. I have documented in the chart those procedures where I was not present during the key portions. I have reviewed the emergency nurses triage note. I agree with the chief complaint, past medical history, past surgical history, allergies, medications, social and family history as documented unless otherwise noted below. Documentation of the HPI, Physical Exam and Medical Decision Making performed by scribkayleen is based on my personal performance of the HPI, PE and MDM. For Physician Assistant/ Nurse Practitioner cases/documentation I have personally evaluated this patient and have completed at least one if not all key elements of the E/M (history, physical exam, and MDM). Additional findings are as noted. 50 yo F sob, pt left ama 7/28, no fever, no vomit, pt denies cp, no injury,   PE Facundo Rusty RN escort for exam  vss gcs 15, neck supple, lungs clear bilaterally, abdomen non tender, no distension, no rigidity,   No calf swelling, no calf tenderness    EKG Interpretation    Interpreted by me  Normal sinus, hr 90, st changes v1 v3,   Normal axis, qtc 459    -2nd ekg, worsening biphasic t wave, v1 -v3, I discussed case with Dr Dereck Saavedra, who concurs  With asa \ heparin, & cath lab activation,   -clean cath, medicine admit    CRITICAL CARE: There was a high probability of clinically significant/life threatening deterioration in this patient's condition which required my urgent intervention. Total critical care time was 20 minutes.   This excludes any time for separately reportable procedures.        [facility] 24, DO  07/29/21 Janel Heredia 13, DO  07/29/21 Janel Heredia 13, DO  07/29/21 9198

## 2021-07-29 NOTE — CONSULTS
[facility]   Consult               Today's Date: 7/29/2021  Patient Name: Britney Baptiste  Date of admission: 7/29/2021  3:58 AM  Patient's age: 49 y.o., 1970  Admission Dx: No admission diagnoses are documented for this encounter. Reason for Admission:  Anterior STEMI    CHIEF COMPLAINT:    Chief Complaint   Patient presents with    Shortness of Breath       History Obtained From:  patient, electronic medical record    HISTORY OF PRESENT ILLNESS:      The patient is a 49 y.o.  female who is admitted to the hospital for anterior STEMI. She has a past medical history of essential hypertension and a smoker. She left AMA yesterday from the hospital.  She presented this time to the hospital with shortness of breath. EKG was done and it showed anterior STEMI. Patient was taken emergently to the Cath Lab. Past Medical History:   has no past medical history on file. Past Surgical History:   has a past surgical history that includes Carpal tunnel release. Home Medications:    Prior to Admission medications    Not on File        Current Facility-Administered Medications: heparin (porcine) injection 3,260 Units, 60 Units/kg, Intravenous, PRN  heparin (porcine) injection 1,630 Units, 30 Units/kg, Intravenous, PRN  heparin 25,000 units in dextrose 5% 250 mL (premix) infusion, 5-30 Units/kg/hr, Intravenous, Continuous    Allergies:  Patient has no known allergies. Social History:   reports that she has been smoking. She has been smoking about 1.00 pack per day. She has never used smokeless tobacco. She reports current alcohol use of about 2.0 - 3.0 standard drinks of alcohol per week. She reports current drug use. Frequency: 7.00 times per week. Drug: Marijuana. Family History: family history includes Arrhythmia in her mother; Cancer in her maternal aunt; Depression in her sister;  Heart Attack in her mother; Heart Disease in her mother; High Blood Pressure in her mother; High Cholesterol in her mother; Learning Disabilities in her brother; Karla Lapine / Marina Amna in her mother; Stroke in her mother. REVIEW OF SYSTEMS:      Constitutional: there has been no unanticipated weight loss. Eyes: No visual changes or diplopia. ENT: No Headaches  Cardiovascular:  Remaining as above  Respiratory: No cough  Gastrointestinal: No abdominal pain. No change in bowel or bladder habits. Genitourinary: No dysuria, trouble voiding, or hematuria. Neurological: No headache. PHYSICAL EXAM:      /84   Pulse 72   Temp 99 °F (37.2 °C)   Resp 16   Ht 5' 2\" (1.575 m)   Wt 120 lb (54.4 kg)   SpO2 100%   BMI 21.95 kg/m²    No intake or output data in the 24 hours ending 07/29/21 0602    Constitutional and General Appearance:   alert, cooperative, no distress and appears stated age  HEENT:  PERRL, EOMI  Respiratory:  Normal excursion and expansion without use of accessory muscles  Resp Auscultation:  Good respiratory effort. No for increased work of breathing. On auscultation: clear to auscultation bilaterally  Cardiovascular:  Regular rate and rhythm  S1/S2  No murmurs  The apical impulse is not displaced  Abdomen:  Soft  Bowel sounds present  Non-tender to palpation  Extremities:  No cyanosis or clubbing  Lower extremity edema: No  Skin:  Warm and dry  Neurological:  Alert and oriented.   Moves all extremities well        DATA:    Diagnostics:    EKG: anterior ST elevation  ECHO:  Pending  Stress Test: Not applicable   Cardiac Angiography: Pending    Labs:     CBC:   Recent Labs     07/29/21  0425 07/29/21  0522   WBC 15.5* 14.2*   HGB 13.6 13.0   HCT 43.2 40.5    308     BMP:   Recent Labs     07/27/21  0727 07/29/21  0425    136   K 2.9* 4.1   CO2 19* 22   BUN 3* 10   CREATININE 0.62 0.60   LABGLOM >60 >60   GLUCOSE 145* 97     Pro-BNP:    Recent Labs     07/27/21  0727 07/29/21  0425   PROBNP 202 710*       Recent Labs     07/27/21  1115 07/27/21  1608 07/29/21  0425   TROPONINT NOT REPORTED NOT REPORTED NOT REPORTED       FASTING LIPID PANEL:  Lab Results   Component Value Date    HDL 32 06/13/2018     Patient's Active Problem List  Active Problems:    * No active hospital problems. *  Resolved Problems:    * No resolved hospital problems. *        IMPRESSION:    Anterior STEMI  Smoker  COPD  Essential hypertension      RECOMMENDATIONS:    Emergent left heart cath      Pre Procedure Conscious Sedation Data:     ASA Class:                  [] I [] II [] III [x] IV     Mallampati Class:       [] I [x] II [] III [] IV        Discussed with patient and Nurse. Carlos Manuel Mata MD, M.D. Fellow, [address] [place] [address]      Please note that part of this chart were generated using voice recognition  dictation software. Although every effort was made to ensure the accuracy of this automated transcription, some errors in transcription may have occurred. I reviewed the patient history personally, and examined by me during the visit. I have reviewed the H&P/Consult / Progress note as completed, and made appropriate changes to the patient exam and treatment plans.       I have reviewed the case with resident / fellow    Patient treatment plan was explained to patient, correction in notes was made as appropriate, and discussed final arrangement based on  my evaluation and exam.    Additional Recommendations:      Dorene Yang MD  [facility]

## 2021-07-29 NOTE — ED PROVIDER NOTES
[facility]  ED  Emergency Department Encounter  EmergencyMedicine Resident     Pt Name:Bre Camarena  MRN: [id number]  [place] 1970  Date of evaluation: 7/29/21  PCP:  Kris Slade    This patient was evaluated in the Emergency Department for symptoms described in the history of present illness. The patient was evaluated in the context of the global COVID-19 pandemic, which necessitated consideration that the patient might be at risk for infection with the SARS-CoV-2 virus that causes COVID-19. Institutional protocols and algorithms that pertain to the evaluation of patients at risk for COVID-19 are in a state of rapid change based on information released by regulatory bodies including the CDC and federal and state organizations. These policies and algorithms were followed during the patient's care in the ED. CHIEF COMPLAINT       Chief Complaint   Patient presents with    Shortness of Breath       HISTORY OF PRESENT ILLNESS  (Location/Symptom, Timing/Onset, Context/Setting, Quality, Duration, Modifying Factors, Severity.)      Teresita Moody is a  49 y.o. female who presents for shortness of breath that returned at rest tonight. She was admitted to internal medicine 2 days ago for same but left ama today as she was feeling improved. He trops had been elevated but stable, ECHO was planned. No chest pain, just feels like tickle in throat makes her cough. No COVID vaccine, neg test on admission 27 July. PAST MEDICAL / SURGICAL / SOCIAL / FAMILY HISTORY      has no past medical history on file. COPD, HTN, ETOH use     has a past surgical history that includes Carpal tunnel release.     Social History     Socioeconomic History    Marital status: Single     Spouse name: Not on file    Number of children: Not on file    Years of education: Not on file    Highest education level: Not on file   Occupational History    Not on file   Tobacco Use    Smoking status: Current Negative for fever. HENT: Negative for sore throat. Respiratory: Positive for cough and shortness of breath. Negative for chest tightness and wheezing. Cardiovascular: Negative for chest pain and leg swelling. Gastrointestinal: Negative for abdominal pain. Genitourinary: Negative for flank pain. Musculoskeletal: Negative for back pain. Skin: Negative for color change. Neurological: Negative for speech difficulty, light-headedness and headaches. Psychiatric/Behavioral: Negative for behavioral problems. PHYSICAL EXAM   (up to 7 for level 4, 8 or more for level 5)      INITIAL VITALS:   BP (!) 153/97   Pulse 66   Temp 99 °F (37.2 °C)   Resp 17   Ht 5' 2\" (1.575 m)   Wt 120 lb (54.4 kg)   SpO2 98%   BMI 21.95 kg/m²     Physical Exam  Constitutional:       General: She is not in acute distress. Appearance: She is well-developed. She is not ill-appearing. HENT:      Head: Normocephalic and atraumatic. Mouth/Throat:      Mouth: Mucous membranes are moist.      Pharynx: Oropharynx is clear. Eyes:      Extraocular Movements: Extraocular movements intact. Pupils: Pupils are equal, round, and reactive to light. Cardiovascular:      Rate and Rhythm: Normal rate and regular rhythm. Pulses: Normal pulses. Heart sounds: Normal heart sounds. Pulmonary:      Effort: Pulmonary effort is normal.      Breath sounds: Normal breath sounds. Chest:      Chest wall: No tenderness. Abdominal:      Palpations: Abdomen is soft. Musculoskeletal:         General: Normal range of motion. Cervical back: Normal range of motion. Right lower leg: No edema. Left lower leg: No edema. Skin:     General: Skin is warm and dry. Capillary Refill: Capillary refill takes less than 2 seconds. Neurological:      General: No focal deficit present. Mental Status: She is alert and oriented to person, place, and time. Motor: No weakness.    Psychiatric: Mood and Affect: Mood normal.         Behavior: Behavior normal.       INITIAL IMPRESSION / DIFFERENTIAL  DIAGNOSIS / PLAN     INITIAL IMPRESSION / DDX:   Was being treated for COPD, hypokalemia, and alcohol use. Undergoing cards eval but left prior to completion. EMERGENCY DEPARTMENT COURSE:  ED Course as of Jul 29 0827   Thu Jul 29, 2021   0439 Cardiology note reviewed - planned for ECHO inpt  Problem list from admission  - prolonged QTc  - trending Trops  - hypokalemia  - HTN  - COPD exacerbation  - tachycardia due to etoh     [SM]   0509 Updated pt on plan to cath lab    [SM]   0529 Pro-BNP(!): 710 [SM]   0529 Troponin, High Sensitivity: 11 [SM]   0542 En route to cath lab    [SM]   0621 Back to room, coronaries clean, will admit to medicine    [SM]   0629 Was drawn approx 5 min after start, hep stopped on arrival to cath lab and not resumed, access site looks good   PTT(!!): 113.5 [SM]      ED Course User Index  [SM] Leonardo Lazar MD       PLAN (Bhaskar Rodriguez / Mary Anne Francisco / EKG):  Orders Placed This Encounter   Procedures    COVID-19, Rapid    XR CHEST PORTABLE    CBC Auto Differential    Troponin    Brain Natriuretic Peptide    Basic Metabolic Panel w/ Reflex to MG    CBC    CBC    APTT    Inpatient consult to Internal Medicine    EKG 12 Lead    PATIENT STATUS (FROM ED OR OR/PROCEDURAL) Inpatient       MEDICATIONS ORDERED:  Orders Placed This Encounter   Medications    aspirin chewable tablet 324 mg    heparin (porcine) injection 3,260 Units    heparin (porcine) injection 3,260 Units    heparin (porcine) injection 1,630 Units    DISCONTD: heparin 25,000 units in dextrose 5% 250 mL (premix) infusion       DIAGNOSTIC RESULTS / PROCEDURES / CONSULTS   LAB RESULTS:  Results for orders placed or performed during the hospital encounter of 07/29/21   COVID-19, Rapid    Specimen: Nasopharyngeal Swab   Result Value Ref Range    Specimen Description . NASOPHARYNGEAL SWAB     SARS-CoV-2, Rapid Not Detected Not Detected   CBC Auto Differential   Result Value Ref Range    WBC 15.5 (H) 3.5 - 11.3 k/uL    RBC 4.63 3.95 - 5.11 m/uL    Hemoglobin 13.6 11.9 - 15.1 g/dL    Hematocrit 43.2 36.3 - 47.1 %    MCV 93.3 82.6 - 102.9 fL    MCH 29.4 25.2 - 33.5 pg    MCHC 31.5 28.4 - 34.8 g/dL    RDW 14.1 11.8 - 14.4 %    Platelets 872 612 - 102 k/uL    MPV 10.0 8.1 - 13.5 fL    NRBC Automated 0.0 0.0 per 100 WBC    Differential Type NOT REPORTED     WBC Morphology NOT REPORTED     RBC Morphology NOT REPORTED     Platelet Estimate NOT REPORTED     Immature Granulocytes 0 0 %    Seg Neutrophils 48 36 - 66 %    Lymphocytes 46 (H) 24 - 44 %    Monocytes 6 1 - 7 %    Eosinophils % 0 (L) 1 - 4 %    Basophils 0 0 - 2 %    Absolute Immature Granulocyte 0.00 0.00 - 0.30 k/uL    Segs Absolute 7.44 1.8 - 7.7 k/uL    Absolute Lymph # 7.13 (H) 1.0 - 4.8 k/uL    Absolute Mono # 0.93 (H) 0.1 - 0.8 k/uL    Absolute Eos # 0.00 0.0 - 0.4 k/uL    Basophils Absolute 0.00 0.0 - 0.2 k/uL    Morphology Normal    Troponin   Result Value Ref Range    Troponin, High Sensitivity 11 0 - 14 ng/L    Troponin T NOT REPORTED <0.03 ng/mL    Troponin Interp NOT REPORTED    Brain Natriuretic Peptide   Result Value Ref Range    Pro- (H) <300 pg/mL    BNP Interpretation Pro-BNP Reference Range:    Basic Metabolic Panel w/ Reflex to MG   Result Value Ref Range    Glucose 97 70 - 99 mg/dL    BUN 10 6 - 20 mg/dL    CREATININE 0.60 0.50 - 0.90 mg/dL    Bun/Cre Ratio NOT REPORTED 9 - 20    Calcium 9.4 8.6 - 10.4 mg/dL    Sodium 136 135 - 144 mmol/L    Potassium 4.1 3.7 - 5.3 mmol/L    Chloride 100 98 - 107 mmol/L    CO2 22 20 - 31 mmol/L    Anion Gap 14 9 - 17 mmol/L    GFR Non-African American >60 >60 mL/min    GFR African American >60 >60 mL/min    GFR Comment          GFR Staging NOT REPORTED    CBC   Result Value Ref Range    WBC 14.2 (H) 3.5 - 11.3 k/uL    RBC 4.38 3.95 - 5.11 m/uL    Hemoglobin 13.0 11.9 - 15.1 g/dL    Hematocrit 40.5 36.3 - 47.1 %    MCV 92.5 82.6 - 102.9 fL    MCH 29.7 25.2 - 33.5 pg    MCHC 32.1 28.4 - 34.8 g/dL    RDW 14.2 11.8 - 14.4 %    Platelets 072 864 - 913 k/uL    MPV 9.8 8.1 - 13.5 fL    NRBC Automated 0.0 0.0 per 100 WBC   APTT   Result Value Ref Range    .5 (HH) 20.5 - 30.5 sec   EKG 12 Lead   Result Value Ref Range    Ventricular Rate 70 BPM    Atrial Rate 277 BPM    QRS Duration 88 ms    Q-T Interval 480 ms    QTc Calculation (Bazett) 518 ms    R Axis 68 degrees    T Axis -70 degrees       RADIOLOGY:  XR CHEST PORTABLE    Result Date: 7/27/2021  EXAMINATION: ONE XRAY VIEW OF THE CHEST 7/27/2021 7:48 am COMPARISON: None. HISTORY: ORDERING SYSTEM PROVIDED HISTORY: sob Reason for Exam: portable, upright FINDINGS: The lungs are without acute focal process. No effusion or pneumothorax. The cardiomediastinal silhouette is normal.  The osseous structures are intact without acute process. No focal airspace consolidation or edema/CHF. CT CHEST PULMONARY EMBOLISM W CONTRAST    Result Date: 7/27/2021  EXAMINATION: CTA OF THE CHEST 7/27/2021 8:00 am TECHNIQUE: CTA of the chest was performed after the administration of intravenous contrast.  Multiplanar reformatted images are provided for review. MIP images are provided for review. Dose modulation, iterative reconstruction, and/or weight based adjustment of the mA/kV was utilized to reduce the radiation dose to as low as reasonably achievable. COMPARISON: None. HISTORY: ORDERING SYSTEM PROVIDED HISTORY: sob TECHNOLOGIST PROVIDED HISTORY: sob Decision Support Exception - unselect if not a suspected or confirmed emergency medical condition->Emergency Medical Condition (MA) Reason for Exam: sob FINDINGS: Pulmonary Arteries: Pulmonary arteries are adequately opacified for evaluation. No evidence of intraluminal filling defect to suggest pulmonary embolism. Main pulmonary artery is normal in caliber. Mediastinum: No evidence of mediastinal lymphadenopathy.   The heart and pericardium demonstrate no acute abnormality. There is no acute abnormality of the thoracic aorta. Lungs/pleura: The lungs are without acute process. No focal consolidation or pulmonary edema. No evidence of pleural effusion or pneumothorax. Upper Abdomen: Limited images of the upper abdomen are unremarkable. Soft Tissues/Bones: No acute bone or soft tissue abnormality. 1.No evidence of pulmonary embolism or acute pulmonary abnormality. EKG:  EKG: initial HR 90, ST changes in v1, v3.    Repeat with anterior STEMI pattern - cards consulted and pt taken to cath lab    Cath result: Normal coronaries, mildly reduced LV function    All EKG's are interpreted by the Emergency Department Physician who either signs or Co-signs this chart in the absence of a cardiologist.    CONSULTS:  IP CONSULT TO INTERNAL MEDICINE  IP CONSULT TO CASE MANAGEMENT   Cardiology via call    FINAL IMPRESSION      1. COPD exacerbation ([address]    2. ST segment changes on electrocardiogram          DISPOSITION / PLAN     DISPOSITION Admitted 07/29/2021 07:10:56 AM  Admitted to internal medicine    PATIENT REFERRED TO:  No follow-up provider specified.     DISCHARGE MEDICATIONS:  New Prescriptions    No medications on file       Deion Rice MD  Emergency Medicine Resident    (Please note that portions of thisnote were completed with a voice recognition program.  Efforts were made to edit the dictations but occasionally words are mis-transcribed.)       Leonardo Lazar MD  Resident  07/29/21 4918

## 2021-07-29 NOTE — ED NOTES
2054 - Repeat EKG completed and handed to Dr April Wood   [address] called  0503 - STEMI called  4127 - Cath team notified     Brian Cruz RN  07/29/21 8167

## 2021-07-29 NOTE — FLOWSHEET NOTE
[address] [facility] [address]     Emergency/Trauma Note    PATIENT NAME: Марина Saul    Shift date: 7/28/2021  Shift day: Wednesday   Shift # 3    Room # 19/19   Name: Марина Saul            Age: 49 y.o. Gender: female          Congregational: None   Place of [employer]: Unknown    Trauma/Incident type: Stemi Alert  Admit Date & Time: 7/29/2021  3:58 AM  TRAUMA NAME: N/A    ADVANCE DIRECTIVES IN CHART? No    NAME OF DECISION MAKER: Per next of kin hierarchy, Lexie Duval ([phone]), is patient's decision maker, unless otherwise specified. RELATIONSHIP OF DECISION MAKER TO PATIENT: Patient's Daughter    PATIENT/EVENT DESCRIPTION:  Марина Saul is a 49 y.o. female who arrived to ED19 and was paged out as a \"STEMI Alert. \" Patient was sitting up in hospital bed, when  visited. Per report, patient arrived to the hospital due to \"shortness of breath. \" Patient will be taken to CATH Lab for intervention. Pt to be admitted to 19/19. SPIRITUAL ASSESSMENT/INTERVENTION:   responded to page and gathered patient information outside room.  introduced herself to patient and learned about her well-being. Patient dialed her daughter, Lexie Duval ([phone]), using room phone, and informed her of her upcoming procedure.  took over call, per patient request, to inform daughter of patient's location. Per daughter, she will likely come to the hospital this morning.  informed her that she can arrive to the ED main entrance and request a  to escort her to patient's room. Patient and daughter thanked  for support. PATIENT BELONGINGS:  No belongings noted    ANY BELONGINGS OF SIGNIFICANT VALUE NOTED:  N/A    REGISTRATION STAFF NOTIFIED?   Yes      WHAT IS YOUR SPIRITUAL CARE PLAN FOR THIS PATIENT?:  [facility] can make follow-up visit, per request. Jory Cure can be reached 24/7 via [employer].     07/29/21 0156   Encounter Summary   Services provided to: Patient; Family   Referral/Consult From: Multi-disciplinary team  (STEMI Alert)   Support System Children   Continue Visiting   (7/28/2021)   Complexity of Encounter Moderate   Spiritual Assessment Completed Yes   Crisis   Type Stemi Alert   Assessment Approachable; Anxious; Helplessness   Intervention Sustaining presence/ Ministry of presence; Discussed illness/injury and it's impact   Outcome Expressed gratitude;Receptive   Spiritual/Zoroastrian   Type Spiritual support     Electronically signed by Golden Solitario on 7/29/2021 at [address] [facility]  [phone]

## 2021-07-29 NOTE — CARE COORDINATION
Case Management Initial Discharge Plan  Ruthy Edmondson,             Met with:patient to discuss discharge plans. Information verified: address, contacts, phone number, , insurance Yes  Insurance Provider: [address] [place] [address]    Emergency Contact/Next of Kin name & number: Brice Wyman (daughter) [phone]  Who are involved in patient's support system? Daughter    PCP: None  Date of last visit: None    Provided - in follow up appts      Discharge Planning    Living Arrangements:  Alone     Home is on third floor  elevator to get into front door,   Location of bedroom/bathroom in home first floor of the apt    Patient able to perform ADL's:Independent    Current Services (outpatient & in home) None  DME equipment: None  DME provider: None    Is patient receiving oral anticoagulation therapy? No      Potential Assistance Needed:  N/A    Patient agreeable to home care: No  [place] of choice provided:  n/a    Prior SNF/Rehab Placement and Facility: None  Agreeable to SNF/Rehab: No  [place] of choice provided: n/a     Evaluation: n/a    Expected Discharge date:  21    Patient expects to be discharged to: If home: is the family and/or caregiver wiling & able to provide support at home? Yes per patient  Who will be providing this support? Daughter    Follow Up Appointment: Best Day/ Time: Monday AM    Transportation provider: Daughter and cabs  Transportation arrangements needed for discharge: Likely will need help    Readmission Risk              Risk of Unplanned Readmission:  12             Does patient have a readmission risk score greater than 14?: No  If yes, follow-up appointment must be made within 7 days of discharge. Goals of Care: Increased comfort      Educated patient on transitional options, provided freedom of choice and are agreeable with plan      Discharge Plan: Plan is to return home independently.  She states her daughter will be able to help her at home.           Electronically signed by Dione Moody RN on 7/29/21 at 5:57 PM EDT

## 2021-07-29 NOTE — OP NOTE
of CAD  [] Hyperlipidemia     [] Cerebrovascular Disease   [] Prior MI       [] Peripheral Vascular disease   [] Prior PCI              [] Diabetes Mellitus    [] Left Main PCI. [] Currently on Dialysis. [] Prior CABG. [x] Currently smoker. [] Cardiac Arrest outside of healthcare facility. [] Yes    [] No        Witnessed     [] Yes   [] No     Arrest after arrival of EMS  [] Yes   [] No     [] Cardiac Arrest at other Facility. [] Yes   [] No    Pre-Procedure Information. Heart Failure       [] Yes    [x] No        Class  [] I      [] II  [] III    [] IV. New Diagnosis    [] Yes  [] No    HF Type      [] Systolic   [] Diastolic          [] Unknown. Diagnostic Test:   EKG       [] Normal   [x] Abnormal    New antiarrhythmia medications:    [] Yes   [] No   New onset atrial fibrillation / Flutter     [] Yes   [] No   ECG Abnormalities:      [] V. Fib   [] Anita V. Tach           [] NS V. T   [] New LBBB           [] T. Inv  []  ST dev > 0.5 mm         [] PVC's freq  [] PVC's infrequent    Stress Test Performed:      [] Yes    [x] No     Type:     [] Stress Echo   [] Exercise Stress Test (no imaging)      [] Stress Nuclear  [] Stress Imaging     Results   [] Negative   [] Positive        [] Indeterminate  [] Unavailable     If Positive/ Risk / Extent of Ischemia:       [] Low  [] Intermediate         [] High  [] Unavailable      Cardiac CTA Performed:     [] Yes    [x] No      Results   [] CAD   [] Non obstructive CAD      [] No CAD   [] Uncertain      [] Unknown   [] Structural Disease.      Pre Procedure Medications:   [x] Yes    [] No         [x] ASA   [] Beta Blockers      [] Nitrate   [] Ca Channel Blockers      [] Ranolazine   [] Statin       [] Plavix/Others antiplatelets      Electronically signed on 7/29/2021 at 5:45 AM by:    Ronnie Ramos MD  Fellow, [address]        I have reviewed the case / procedure with resident / fellow  I have examined the patient personally  Patient agree with treatment plan as discussed before, final arrangement based on my evaluation and exam.    Risk and benefit of procedure planned were explained in details. Procedure was performed by me personally, with all aspect of the procedure being done using standard protocol. Note was modified based on my own assessment and treatment.     Charity Parr MD  [facility]

## 2021-07-29 NOTE — H&P
[address]     Department of Internal Medicine - Staff Internal Medicine Teaching Service          ADMISSION NOTE/HISTORY AND PHYSICAL EXAMINATION   Date: 7/29/2021  Patient Name: Karis Chavez  Date of admission: 7/29/2021  3:58 AM  YOB: 1970  PCP: Mandeep Duncan  History Obtained From:  patient    CHIEF COMPLAINT     Chief complaint: shortness of breath    HISTORY OF PRESENTING ILLNESS     The patient is a pleasant 49 y.o. female presents with PMH of HTN with chief complaint of shortness of breath on exertion. She was admitted day before yesterday for SOB, and was found to be in acute hypoxic respiratory failure, she was treated with bipap, duoneb, solu medrol, and azithromycin, she was also found to be in htn emergency (elevated troponin). She left AMA yesterday. Today, she started feeling short of breath in the morning. She came and found to have elevated troponin 33, 24. EKG at the time showed ST changes. Cardio called an STEMI, she underwent cath today. Her cath showed normal coronaries and EF of 40%. Her blood pressure was 164/113. She is treated with prinzide, norvasc. She also received tapering dose of steroids. Her blood pressure came down to 146/86. Her labs were unremarkable  Her cardiac work up showed proBNP 710 (last admission 202)  Her WBC count was elevated to 14.2. Her CXR was unchanged from previous admission showing left lateral 9th rib subacute mildly displaced rib fracture. Review of Systems:  Review of Systems   Constitutional: Negative for activity change, appetite change and chills. HENT: Negative for congestion and sore throat. Respiratory: Positive for cough and shortness of breath. Negative for apnea and chest tightness. Cardiovascular: Negative for chest pain, palpitations and leg swelling. Gastrointestinal: Negative for abdominal distention and abdominal pain.    Genitourinary: Negative for difficulty urinating and midline. Respiratory: Chest was symmetrical without dullness to percussion. Breath sounds bilaterally were clear to auscultation. There were no wheezes, rhonchi or rales. There is no intercostal retraction or use of accessory muscles. No egophony noted. Cardiovascular: Regular without murmur, clicks, gallops or rubs. Abdomen: Slightly rounded and soft without organomegaly. No rebound, rigidity or guarding was appreciated. Lymphatic: No lymphadenopathy. Musculoskeletal: Normal curvature of the spine. No gross muscle weakness. Extremities:  No lower extremity edema, ulcerations, tenderness, varicosities or erythema. Muscle size, tone and strength are normal.  No involuntary movements are noted. Skin:  Warm and dry. Good color, turgor and pigmentation. No lesions or scars.   No cyanosis or clubbing  Neurological/Psychiatric: The patient's general behavior, level of consciousness, thought content and emotional status is normal.          INVESTIGATIONS     Laboratory Testing:     Recent Results (from the past 24 hour(s))   CBC Auto Differential    Collection Time: 07/29/21  4:25 AM   Result Value Ref Range    WBC 15.5 (H) 3.5 - 11.3 k/uL    RBC 4.63 3.95 - 5.11 m/uL    Hemoglobin 13.6 11.9 - 15.1 g/dL    Hematocrit 43.2 36.3 - 47.1 %    MCV 93.3 82.6 - 102.9 fL    MCH 29.4 25.2 - 33.5 pg    MCHC 31.5 28.4 - 34.8 g/dL    RDW 14.1 11.8 - 14.4 %    Platelets 103 853 - 300 k/uL    MPV 10.0 8.1 - 13.5 fL    NRBC Automated 0.0 0.0 per 100 WBC    Differential Type NOT REPORTED     WBC Morphology NOT REPORTED     RBC Morphology NOT REPORTED     Platelet Estimate NOT REPORTED     Immature Granulocytes 0 0 %    Seg Neutrophils 48 36 - 66 %    Lymphocytes 46 (H) 24 - 44 %    Monocytes 6 1 - 7 %    Eosinophils % 0 (L) 1 - 4 %    Basophils 0 0 - 2 %    Absolute Immature Granulocyte 0.00 0.00 - 0.30 k/uL    Segs Absolute 7.44 1.8 - 7.7 k/uL    Absolute Lymph # 7.13 (H) 1.0 - 4.8 k/uL    Absolute Mono # 0.93 (H) 0.1 - 0.8 k/uL    Absolute Eos # 0.00 0.0 - 0.4 k/uL    Basophils Absolute 0.00 0.0 - 0.2 k/uL    Morphology Normal    Troponin    Collection Time: 07/29/21  4:25 AM   Result Value Ref Range    Troponin, High Sensitivity 11 0 - 14 ng/L    Troponin T NOT REPORTED <0.03 ng/mL    Troponin Interp NOT REPORTED    Brain Natriuretic Peptide    Collection Time: 07/29/21  4:25 AM   Result Value Ref Range    Pro- (H) <300 pg/mL    BNP Interpretation Pro-BNP Reference Range:    Basic Metabolic Panel w/ Reflex to MG    Collection Time: 07/29/21  4:25 AM   Result Value Ref Range    Glucose 97 70 - 99 mg/dL    BUN 10 6 - 20 mg/dL    CREATININE 0.60 0.50 - 0.90 mg/dL    Bun/Cre Ratio NOT REPORTED 9 - 20    Calcium 9.4 8.6 - 10.4 mg/dL    Sodium 136 135 - 144 mmol/L    Potassium 4.1 3.7 - 5.3 mmol/L    Chloride 100 98 - 107 mmol/L    CO2 22 20 - 31 mmol/L    Anion Gap 14 9 - 17 mmol/L    GFR Non-African American >60 >60 mL/min    GFR African American >60 >60 mL/min    GFR Comment          GFR Staging NOT REPORTED    EKG 12 Lead    Collection Time: 07/29/21  4:52 AM   Result Value Ref Range    Ventricular Rate 70 BPM    Atrial Rate 277 BPM    QRS Duration 88 ms    Q-T Interval 480 ms    QTc Calculation (Bazett) 518 ms    R Axis 68 degrees    T Axis -70 degrees   COVID-19, Rapid    Collection Time: 07/29/21  5:00 AM    Specimen: Nasopharyngeal Swab   Result Value Ref Range    Specimen Description . NASOPHARYNGEAL SWAB     SARS-CoV-2, Rapid Not Detected Not Detected   CBC    Collection Time: 07/29/21  5:22 AM   Result Value Ref Range    WBC 14.2 (H) 3.5 - 11.3 k/uL    RBC 4.38 3.95 - 5.11 m/uL    Hemoglobin 13.0 11.9 - 15.1 g/dL    Hematocrit 40.5 36.3 - 47.1 %    MCV 92.5 82.6 - 102.9 fL    MCH 29.7 25.2 - 33.5 pg    MCHC 32.1 28.4 - 34.8 g/dL    RDW 14.2 11.8 - 14.4 %    Platelets 113 272 - 241 k/uL    MPV 9.8 8.1 - 13.5 fL    NRBC Automated 0.0 0.0 per 100 WBC   APTT    Collection Time: 07/29/21  5:22 AM   Result Value Ref Range .5 (HH) 20.5 - 30.5 sec       Imaging:   XR CHEST PORTABLE    Result Date: 7/29/2021  Unchanged appearance of left lateral 9th rib subacute appearing mildly displaced rib fracture. Recommend clinical correlation. Otherwise, unremarkable upright portable AP view of the chest.     XR CHEST PORTABLE    Result Date: 7/27/2021  No focal airspace consolidation or edema/CHF. CT CHEST PULMONARY EMBOLISM W CONTRAST    Result Date: 7/27/2021  1. No evidence of pulmonary embolism or acute pulmonary abnormality. ASSESSMENT & PLAN     ASSESSMENT / PLAN:     IMPRESSION  This is a 49 y.o. female who presented with shortness of breath and found to have elevated troponin. Patient admitted to inpatient status because suspected NSTEMI    Active Problems:  COPD (chronic obstructive pulmonary disease) ([address]  -  On solu medrol, tapering    NSTEMI  - troponin 30, 20  - ST changes concerning for anterior stemi  - s/p Cath, showing normal coronaries, EF 40%    Leukocytosis  - wbc 14.2 (6.1 on previous admission)  - monitoring wbc count  - Vitals stable, UA negative, CXR negative    Hypertension, uncontrolled  - on norvasc, prinzide   - monitoring blood pressure      DVT ppx: heparin  GI ppx: famotidine    PT/OT/SW: pt/ot consult  Discharge Planning:  consult    Elissa Flores MD  Internal Medicine Resident, PGY-1  [address], [place]  7/29/2021, 10:34 AM    Attending Physician Statement  I have discussed the care of Bakari Logan with the resident team. I have examined the patient myself and taken ros and hpi , including pertinent history and exam findings,  with the resident. I have reviewed the key elements of all parts of the encounter with the resident. I agree with the assessment, plan and orders as documented by the resident. Active Problems:    COPD (chronic obstructive pulmonary disease) (HCC)  Resolved Problems:    * No resolved hospital problems.  *    Patient admitted with shortness of breath chest pain concerning for ACS, second presentation with similar symptoms, underwent cardiac cath this morning-clean coronaries.   COPD exacerbation, bilateral wheeze on exam-start oral steroids  Uncontrolled hypertension-medication adjusted-potential discharge today  Electronically signed by Paul Suggs MD

## 2021-07-30 VITALS
TEMPERATURE: 98.7 F | BODY MASS INDEX: 22.08 KG/M2 | SYSTOLIC BLOOD PRESSURE: 105 MMHG | WEIGHT: 120 LBS | DIASTOLIC BLOOD PRESSURE: 83 MMHG | HEART RATE: 104 BPM | OXYGEN SATURATION: 95 % | RESPIRATION RATE: 22 BRPM | HEIGHT: 62 IN

## 2021-07-30 PROBLEM — R07.9 CHEST PAIN WITH HIGH RISK FOR CARDIAC ETIOLOGY: Status: RESOLVED | Noted: 2021-07-29 | Resolved: 2021-07-30

## 2021-07-30 PROBLEM — I10 HYPERTENSION: Status: ACTIVE | Noted: 2021-07-30

## 2021-07-30 PROBLEM — D72.829 LEUKOCYTOSIS: Status: ACTIVE | Noted: 2021-07-30

## 2021-07-30 PROBLEM — J96.01 ACUTE RESPIRATORY FAILURE WITH HYPOXIA (HCC): Status: RESOLVED | Noted: 2021-07-27 | Resolved: 2021-07-30

## 2021-07-30 LAB
ABSOLUTE EOS #: 0 K/UL (ref 0–0.4)
ABSOLUTE IMMATURE GRANULOCYTE: 0.11 K/UL (ref 0–0.3)
ABSOLUTE LYMPH #: 5.02 K/UL (ref 1–4.8)
ABSOLUTE MONO #: 0.57 K/UL (ref 0.1–0.8)
ANION GAP SERPL CALCULATED.3IONS-SCNC: 14 MMOL/L (ref 9–17)
BASOPHILS # BLD: 0 % (ref 0–2)
BASOPHILS ABSOLUTE: 0 K/UL (ref 0–0.2)
BUN BLDV-MCNC: 6 MG/DL (ref 6–20)
BUN/CREAT BLD: ABNORMAL (ref 9–20)
CALCIUM SERPL-MCNC: 9.5 MG/DL (ref 8.6–10.4)
CHLORIDE BLD-SCNC: 97 MMOL/L (ref 98–107)
CO2: 24 MMOL/L (ref 20–31)
CREAT SERPL-MCNC: 0.59 MG/DL (ref 0.5–0.9)
DIFFERENTIAL TYPE: ABNORMAL
EOSINOPHILS RELATIVE PERCENT: 0 % (ref 1–4)
GFR AFRICAN AMERICAN: >60 ML/MIN
GFR NON-AFRICAN AMERICAN: >60 ML/MIN
GFR SERPL CREATININE-BSD FRML MDRD: ABNORMAL ML/MIN/{1.73_M2}
GFR SERPL CREATININE-BSD FRML MDRD: ABNORMAL ML/MIN/{1.73_M2}
GLUCOSE BLD-MCNC: 88 MG/DL (ref 70–99)
GLUCOSE BLD-MCNC: 99 MG/DL (ref 65–105)
HCT VFR BLD CALC: 43.3 % (ref 36.3–47.1)
HEMOGLOBIN: 13.9 G/DL (ref 11.9–15.1)
IMMATURE GRANULOCYTES: 1 %
LV EF: 38 %
LVEF MODALITY: NORMAL
LYMPHOCYTES # BLD: 44 % (ref 24–44)
MAGNESIUM: 1.9 MG/DL (ref 1.6–2.6)
MCH RBC QN AUTO: 29.6 PG (ref 25.2–33.5)
MCHC RBC AUTO-ENTMCNC: 32.1 G/DL (ref 28.4–34.8)
MCV RBC AUTO: 92.1 FL (ref 82.6–102.9)
MONOCYTES # BLD: 5 % (ref 1–7)
MORPHOLOGY: NORMAL
NRBC AUTOMATED: 0 PER 100 WBC
PDW BLD-RTO: 13.9 % (ref 11.8–14.4)
PLATELET # BLD: 297 K/UL (ref 138–453)
PLATELET ESTIMATE: ABNORMAL
PMV BLD AUTO: 10.2 FL (ref 8.1–13.5)
POTASSIUM SERPL-SCNC: 3.2 MMOL/L (ref 3.7–5.3)
RBC # BLD: 4.7 M/UL (ref 3.95–5.11)
RBC # BLD: ABNORMAL 10*6/UL
SEG NEUTROPHILS: 50 % (ref 36–66)
SEGMENTED NEUTROPHILS ABSOLUTE COUNT: 5.7 K/UL (ref 1.8–7.7)
SODIUM BLD-SCNC: 135 MMOL/L (ref 135–144)
WBC # BLD: 11.4 K/UL (ref 3.5–11.3)
WBC # BLD: ABNORMAL 10*3/UL

## 2021-07-30 PROCEDURE — 80048 BASIC METABOLIC PNL TOTAL CA: CPT

## 2021-07-30 PROCEDURE — 36415 COLL VENOUS BLD VENIPUNCTURE: CPT

## 2021-07-30 PROCEDURE — 82947 ASSAY GLUCOSE BLOOD QUANT: CPT

## 2021-07-30 PROCEDURE — 6370000000 HC RX 637 (ALT 250 FOR IP): Performed by: NURSE PRACTITIONER

## 2021-07-30 PROCEDURE — 6370000000 HC RX 637 (ALT 250 FOR IP): Performed by: STUDENT IN AN ORGANIZED HEALTH CARE EDUCATION/TRAINING PROGRAM

## 2021-07-30 PROCEDURE — 6370000000 HC RX 637 (ALT 250 FOR IP)

## 2021-07-30 PROCEDURE — 6360000002 HC RX W HCPCS

## 2021-07-30 PROCEDURE — 85025 COMPLETE CBC W/AUTO DIFF WBC: CPT

## 2021-07-30 PROCEDURE — 93356 MYOCRD STRAIN IMG SPCKL TRCK: CPT

## 2021-07-30 PROCEDURE — 83735 ASSAY OF MAGNESIUM: CPT

## 2021-07-30 PROCEDURE — 99232 SBSQ HOSP IP/OBS MODERATE 35: CPT | Performed by: INTERNAL MEDICINE

## 2021-07-30 PROCEDURE — 2580000003 HC RX 258

## 2021-07-30 PROCEDURE — 93306 TTE W/DOPPLER COMPLETE: CPT

## 2021-07-30 RX ORDER — IPRATROPIUM BROMIDE AND ALBUTEROL SULFATE 2.5; .5 MG/3ML; MG/3ML
3 SOLUTION RESPIRATORY (INHALATION) EVERY 4 HOURS PRN
Qty: 360 ML | Refills: 0 | Status: SHIPPED | OUTPATIENT
Start: 2021-07-30

## 2021-07-30 RX ORDER — GUAIFENESIN 600 MG/1
600 TABLET, EXTENDED RELEASE ORAL 2 TIMES DAILY
Qty: 10 TABLET | Refills: 0 | Status: SHIPPED | OUTPATIENT
Start: 2021-07-30

## 2021-07-30 RX ORDER — TIOTROPIUM BROMIDE 18 UG/1
18 CAPSULE ORAL; RESPIRATORY (INHALATION) DAILY
Qty: 90 CAPSULE | Refills: 1 | Status: SHIPPED | OUTPATIENT
Start: 2021-07-30

## 2021-07-30 RX ORDER — PREDNISONE 20 MG/1
20 TABLET ORAL DAILY
Qty: 10 TABLET | Refills: 0 | Status: CANCELLED | OUTPATIENT
Start: 2021-07-30 | End: 2021-08-09

## 2021-07-30 RX ORDER — LISINOPRIL AND HYDROCHLOROTHIAZIDE 12.5; 1 MG/1; MG/1
2 TABLET ORAL DAILY
Qty: 30 TABLET | Refills: 3 | Status: SHIPPED | OUTPATIENT
Start: 2021-07-30 | End: 2021-07-30 | Stop reason: HOSPADM

## 2021-07-30 RX ORDER — PREDNISONE 10 MG/1
10 TABLET ORAL DAILY
Qty: 10 TABLET | Refills: 0 | Status: CANCELLED | OUTPATIENT
Start: 2021-08-04 | End: 2021-08-14

## 2021-07-30 RX ORDER — CARVEDILOL 6.25 MG/1
6.25 TABLET ORAL 2 TIMES DAILY WITH MEALS
Qty: 60 TABLET | Refills: 1 | Status: SHIPPED | OUTPATIENT
Start: 2021-07-30

## 2021-07-30 RX ORDER — ASPIRIN 81 MG/1
81 TABLET, CHEWABLE ORAL DAILY
Status: DISCONTINUED | OUTPATIENT
Start: 2021-07-30 | End: 2021-07-30 | Stop reason: HOSPADM

## 2021-07-30 RX ORDER — CARVEDILOL 6.25 MG/1
6.25 TABLET ORAL 2 TIMES DAILY WITH MEALS
Status: DISCONTINUED | OUTPATIENT
Start: 2021-07-30 | End: 2021-07-30 | Stop reason: HOSPADM

## 2021-07-30 RX ORDER — ALBUTEROL SULFATE 2.5 MG/3ML
2.5 SOLUTION RESPIRATORY (INHALATION) EVERY 4 HOURS PRN
Qty: 120 EACH | Refills: 3 | Status: SHIPPED | OUTPATIENT
Start: 2021-07-30

## 2021-07-30 RX ORDER — AZITHROMYCIN 250 MG/1
250 TABLET, FILM COATED ORAL DAILY
Status: DISCONTINUED | OUTPATIENT
Start: 2021-07-30 | End: 2021-07-30 | Stop reason: HOSPADM

## 2021-07-30 RX ORDER — POTASSIUM CHLORIDE 20 MEQ/1
40 TABLET, EXTENDED RELEASE ORAL ONCE
Status: DISCONTINUED | OUTPATIENT
Start: 2021-07-30 | End: 2021-07-30

## 2021-07-30 RX ORDER — PREDNISONE 20 MG/1
20 TABLET ORAL 2 TIMES DAILY
Qty: 10 TABLET | Refills: 0 | Status: SHIPPED | OUTPATIENT
Start: 2021-07-30 | End: 2021-08-04

## 2021-07-30 RX ORDER — POTASSIUM CHLORIDE 20 MEQ/1
40 TABLET, EXTENDED RELEASE ORAL ONCE
Status: COMPLETED | OUTPATIENT
Start: 2021-07-30 | End: 2021-07-30

## 2021-07-30 RX ORDER — AMLODIPINE BESYLATE 10 MG/1
10 TABLET ORAL DAILY
Qty: 30 TABLET | Refills: 3 | Status: SHIPPED | OUTPATIENT
Start: 2021-07-30

## 2021-07-30 RX ORDER — AZITHROMYCIN 250 MG/1
250 TABLET, FILM COATED ORAL DAILY
Qty: 10 TABLET | Refills: 0 | Status: SHIPPED | OUTPATIENT
Start: 2021-07-31 | End: 2021-08-10

## 2021-07-30 RX ORDER — ASPIRIN 81 MG/1
81 TABLET, CHEWABLE ORAL DAILY
Qty: 30 TABLET | Refills: 3 | Status: SHIPPED | OUTPATIENT
Start: 2021-07-31

## 2021-07-30 RX ORDER — PREDNISONE 1 MG/1
15 TABLET ORAL DAILY
Qty: 30 TABLET | Refills: 0 | Status: CANCELLED | OUTPATIENT
Start: 2021-08-01 | End: 2021-08-11

## 2021-07-30 RX ORDER — PREDNISONE 1 MG/1
5 TABLET ORAL DAILY
Qty: 10 TABLET | Refills: 0 | Status: CANCELLED | OUTPATIENT
Start: 2021-08-07 | End: 2021-08-17

## 2021-07-30 RX ADMIN — POTASSIUM CHLORIDE 40 MEQ: 1500 TABLET, EXTENDED RELEASE ORAL at 09:03

## 2021-07-30 RX ADMIN — SODIUM CHLORIDE, PRESERVATIVE FREE 10 ML: 5 INJECTION INTRAVENOUS at 09:02

## 2021-07-30 RX ADMIN — FAMOTIDINE 20 MG: 20 TABLET, FILM COATED ORAL at 08:57

## 2021-07-30 RX ADMIN — GUAIFENESIN 600 MG: 600 TABLET, EXTENDED RELEASE ORAL at 08:58

## 2021-07-30 RX ADMIN — ASPIRIN 81 MG: 81 TABLET, CHEWABLE ORAL at 14:18

## 2021-07-30 RX ADMIN — LISINOPRIL AND HYDROCHLOROTHIAZIDE 2 TABLET: 12.5; 1 TABLET ORAL at 08:59

## 2021-07-30 RX ADMIN — CARVEDILOL 6.25 MG: 6.25 TABLET, FILM COATED ORAL at 16:29

## 2021-07-30 RX ADMIN — BENZONATATE 100 MG: 100 CAPSULE ORAL at 08:58

## 2021-07-30 RX ADMIN — AMLODIPINE BESYLATE 10 MG: 10 TABLET ORAL at 08:58

## 2021-07-30 RX ADMIN — PREDNISONE 20 MG: 20 TABLET ORAL at 08:58

## 2021-07-30 RX ADMIN — AZITHROMYCIN MONOHYDRATE 250 MG: 250 TABLET ORAL at 10:51

## 2021-07-30 RX ADMIN — HEPARIN SODIUM 5000 UNITS: 5000 INJECTION INTRAVENOUS; SUBCUTANEOUS at 05:44

## 2021-07-30 NOTE — PLAN OF CARE
Problem: Cardiac:  Goal: Ability to maintain an adequate cardiac output will improve  Description: Ability to maintain an adequate cardiac output will improve  7/30/2021 1442 by Rosanne Townsend RN  Outcome: Ongoing     Problem: Cardiac:  Goal: Hemodynamic stability will improve  Description: Hemodynamic stability will improve  7/30/2021 1442 by Rosanne Townsend RN  Outcome: Ongoing     Problem: Fluid Volume:  Goal: Ability to achieve and maintain adequate urine output will improve  Description: Ability to achieve and maintain adequate urine output will improve  7/30/2021 1442 by Rosanne Townsend RN  Outcome: Ongoing     Problem: Respiratory:  Goal: Respiratory status will improve  Description: Respiratory status will improve  7/30/2021 1442 by Rosanne Townsend RN  Outcome: Ongoing     Problem: Falls - Risk of:  Goal: Will remain free from falls  Description: Will remain free from falls  7/30/2021 1442 by Rosanne Townsend RN  Outcome: Ongoing     Problem: Falls - Risk of:  Goal: Absence of physical injury  Description: Absence of physical injury  7/30/2021 1442 by Rosanne Townsend RN  Outcome: Ongoing    Care plan in progress. Will monitor.

## 2021-07-30 NOTE — DISCHARGE SUMMARY
[address]     Department of Internal Medicine - Staff Internal Medicine Teaching Service    INPATIENT DISCHARGE SUMMARY      Patient Identification:  Vivianne Burkitt is a 49 y.o. female. :  1970  MRN: [id number]     Acct: [de-identified]   PCP: Rosalia Perez  Admit Date:  2021  Discharge date and time: 2021  5:15 PM   Attending Provider: No att. providers found                                     [address] Problem Lists:  Principal Problem:    Acute respiratory failure with hypoxia ([address]  Active Problems:    Hypertensive urgency    Sinus tachycardia    Hypokalemia    Smoking addiction    Alcohol abuse with intoxication ([address]    Diarrhea due to alcohol intake    Troponin level elevated    Shortness of breath  Resolved Problems:    * No resolved hospital problems. *      HOSPITAL STAY     Brief Inpatient course:   Vivianne Burkitt is a 49 y.o. female who was admitted for the management of Acute respiratory failure with hypoxia ([address] presented to the emergency department with This is a 51 y.o. female who presented with redness of breath and found to have respiratory failure with hypoxia. Patient admitted to inpatient status for management of acute respiratory failure. Procedures/ Significant Interventions:    None    Consults:     Consults:     Final Specialist Recommendations/Findings:   IP CONSULT TO CARDIOLOGY  IP CONSULT TO INTERNAL MEDICINE  IP CONSULT TO CASE MANAGEMENT      Any Hospital Acquired Infections: none    Discharge Functional Status:  stable    DISCHARGE PLAN     Disposition: Pt left AMA before being discharged     Patient Instructions: There are no discharge medications for this patient. Activity: activity as tolerated    Diet: regular diet    Follow-up:    No follow-up provider specified.     Patient Instructions:   - Follow up with PCP for post-hospital visit   - Continue to take medications as prescribed   - It is important to stop smoking - talk to PCP about prescribing you Chantix   - If you begin to experience symptoms similar to the ones you had on this recent admission, please return to the emergency department   Follow up labs: None   Follow up imaging: None     Note that over 30 minutes was spent in preparing discharge papers, discussing discharge with patient, medication review, etc.      Fredi Reyes MD  Internal Medicine Resident, PGY-1  [facility];  [place], [address] [place] [address] Drive  7/30/2021, 12:34 PM

## 2021-07-30 NOTE — PROGRESS NOTES
CLINICAL PHARMACY NOTE: MEDS TO BEDS    Total # of Prescriptions Filled: 9   The following medications were delivered to the patient:  · Albuterol  · Amlodipine 10mg  · Mucinex ER 600mg  · Duoneb  · Spiriva  · Aspirin 81mg chew  · Azithromycin 250mg  · Prednisone 20mg  · Carvedilol 6.25mg    Additional Documentation: delivered to patient 7/30 at [phone]. No co-pay.  KT

## 2021-07-30 NOTE — PROGRESS NOTES
[facility]  Internal Medicine Teaching Residency Program  Inpatient Daily Progress Note  ______________________________________________________________________________    Patient: Richie Mcgrath  YOB: 1970   FZO:[id number]    Acct: [de-identified]     Room: [address] [place] [address]  Admit date: 2021  Today's date: 21  Number of days in the hospital: 1    SUBJECTIVE   Admitting Diagnosis: <principal problem not specified>  CC: Shortness of breath     - Pt examined at bedside. Chart & results reviewed. - No acute events overnight   - Pt denies any shortness of breath   - Currently, vital signs are stable   - K: 3.2 --> replaced with a one time dose of 40 mEq   - WBC: 14.2 --> 11.4 - on prednisone   - Pt has no other complaints at this time   - plan for discharge home today     ROS:  Constitutional:  negative for chills, fevers, sweats  Respiratory:  negative for cough, dyspnea on exertion, hemoptysis, shortness of breath, wheezing  Cardiovascular:  negative for chest pain, chest pressure/discomfort, lower extremity edema, palpitations  Gastrointestinal:  negative for abdominal pain, constipation, diarrhea, nausea, vomiting  Neurological:  negative for dizziness, headache  BRIEF HISTORY     This is a 49 y.o. female who presented with shortness of breath and found to have elevated troponin. Patient admitted to inpatient status because suspected NSTEMI    OBJECTIVE     Vital Signs:  BP (!) 142/89   Pulse 72   Temp 98 °F (36.7 °C) (Oral)   Resp 16   Ht 5' 2\" (1.575 m)   Wt 120 lb (54.4 kg)   SpO2 96%   BMI 21.95 kg/m²     Temp (24hrs), Av.5 °F (36.9 °C), Min:98 °F (36.7 °C), Max:99 °F (37.2 °C)    In: 325   Out: -     Physical Exam:  Constitutional: This is a well developed, well nourished, 18.5-24.9 - Normal 49y.o. year old female who is alert, oriented, cooperative and in no apparent distress. Head:normocephalic and atraumatic. EENT:  RUIZ.   No conjunctival injections. Septum was midline, mucosa was without erythema, exudates or cobblestoning. No thrush was noted. Neck: Supple without thyromegaly. No elevated JVP. Trachea was midline. Respiratory: Chest was symmetrical without dullness to percussion. Breath sounds bilaterally were clear to auscultation. There were no wheezes, rhonchi or rales. There is no intercostal retraction or use of accessory muscles. No egophony noted. Cardiovascular: Regular without murmur, clicks, gallops or rubs. Abdomen: Slightly rounded and soft without organomegaly. No rebound, rigidity or guarding was appreciated. Lymphatic: No lymphadenopathy. Musculoskeletal: Normal curvature of the spine. No gross muscle weakness. Extremities:  No lower extremity edema, ulcerations, tenderness, varicosities or erythema. Muscle size, tone and strength are normal.  No involuntary movements are noted. Skin:  Warm and dry. Good color, turgor and pigmentation. No lesions or scars.   No cyanosis or clubbing  Neurological/Psychiatric: The patient's general behavior, level of consciousness, thought content and emotional status is normal.        Medications:  Scheduled Medications:    sodium chloride flush  5-40 mL Intravenous 2 times per day    famotidine  20 mg Oral BID    heparin (porcine)  5,000 Units Subcutaneous 3 times per day    predniSONE  20 mg Oral Daily    Followed by   Alf Caldwell ON 8/1/2021] predniSONE  15 mg Oral Daily    Followed by   Alf Caldwell ON 8/4/2021] predniSONE  10 mg Oral Daily    Followed by   Alf Caldwell ON 8/7/2021] predniSONE  5 mg Oral Daily    guaiFENesin  600 mg Oral BID    amLODIPine  10 mg Oral Daily    lisinopril-hydroCHLOROthiazide  2 tablet Oral Daily     Continuous Infusions:    sodium chloride       PRN Medicationssodium chloride flush, 5-40 mL, PRN  sodium chloride, 25 mL, PRN  polyethylene glycol, 17 g, Daily PRN  acetaminophen, 650 mg, Q6H PRN   Or  acetaminophen, 650 mg, Q6H PRN  potassium chloride, 40 mEq, PRN   Or  potassium alternative oral replacement, 40 mEq, PRN   Or  potassium chloride, 10 mEq, PRN  benzonatate, 100 mg, TID PRN  ipratropium-albuterol, 1 ampule, Q4H PRN  albuterol, 2.5 mg, As Directed RT PRN        Diagnostic Labs:  CBC:   Recent Labs     07/29/21  0425 07/29/21  0522 07/30/21  0715   WBC 15.5* 14.2* 11.4*   RBC 4.63 4.38 4.70   HGB 13.6 13.0 13.9   HCT 43.2 40.5 43.3   MCV 93.3 92.5 92.1   RDW 14.1 14.2 13.9    308 297     BMP:   Recent Labs     07/29/21  0425 07/30/21  0715    135   K 4.1 3.2*    97*   CO2 22 24   BUN 10 6   CREATININE 0.60 0.59     BNP: No results for input(s): BNP in the last 72 hours. PT/INR: No results for input(s): PROTIME, INR in the last 72 hours. APTT:   Recent Labs     07/29/21  0522   APTT 113.5*     CARDIAC ENZYMES: No results for input(s): CKMB, CKMBINDEX, TROPONINI in the last 72 hours. Invalid input(s): CKTOTAL;3  FASTING LIPID PANEL:  Lab Results   Component Value Date    HDL 32 (L) 06/13/2018     LIVER PROFILE:   Recent Labs     07/29/21  0946   AST 29   ALT 26   BILIDIR 0.12   BILITOT 0.34   ALKPHOS 73      MICROBIOLOGY:   Lab Results   Component Value Date/Time    CULTURE NO GROWTH 20 HOURS 07/29/2021 09:46 AM       Imaging:    XR CHEST PORTABLE    Result Date: 7/29/2021  Unchanged appearance of left lateral 9th rib subacute appearing mildly displaced rib fracture. Recommend clinical correlation. Otherwise, unremarkable upright portable AP view of the chest.     XR CHEST PORTABLE    Result Date: 7/27/2021  No focal airspace consolidation or edema/CHF. CT CHEST PULMONARY EMBOLISM W CONTRAST    Result Date: 7/27/2021  1. No evidence of pulmonary embolism or acute pulmonary abnormality.        ASSESSMENT & PLAN     ASSESSMENT / PLAN:   Active Problems:    COPD (chronic obstructive pulmonary disease) (HCC)    Chest pain with high risk for cardiac etiology    Leukocytosis    Hypertension  Resolved Problems:    * No resolved hospital problems. *      Active Problems:  COPD (chronic obstructive pulmonary disease) (HCC)  -  On solumedrol, tapering     NSTEMI  - Troponin 30, 20 --> 11  - ST changes concerning for anterior stemi  - S/P Cath, showing normal coronaries, EF 40%     Leukocytosis  - WBC 14.2 (6.1 on previous admission)  - Monitoring wbc count  - Vitals stable  - UA negative  - CXR negative     Hypertension, uncontrolled  - On norvasc and prinzide   - Monitoring blood pressure        DVT ppx: heparin  GI ppx: famotidine     PT/OT/SW: Consult  Discharge Planning: Discharge home today      Pro Spencer MD  Internal Medicine Resident, PGY-1  [facility]; [place], [place]  7/30/2021, 9:29 AM   Attending Physician Statement  I have discussed the care of Clarita Colon, including pertinent history and exam findings,  with the resident. I have seen and examined the patient and the key elements of all parts of the encounter have been performed by me. I agree with the assessment, plan and orders as documented by the resident with additions . Treatment plan Discussed with nursing staff in detail , all questions answered . Electronically signed by Norma Husain MD on   7/30/21 at 3:21 PM EDT    Please note that this chart was generated using voice recognition Dragon dictation software. Although every effort was made to ensure the accuracy of this automated transcription, some errors in transcription may have occurred.

## 2021-07-30 NOTE — PLAN OF CARE
Problem: Cardiac:  Goal: Ability to maintain an adequate cardiac output will improve  Description: Ability to maintain an adequate cardiac output will improve  7/30/2021 0452 by Ynes Patten RN  Outcome: Ongoing  7/29/2021 1536 by Gaurav Riojas RN  Outcome: Ongoing  Goal: Hemodynamic stability will improve  Description: Hemodynamic stability will improve  7/30/2021 0452 by Ynes Patten RN  Outcome: Ongoing  7/29/2021 1536 by Gaurav Riojas RN  Outcome: Ongoing     Problem: Fluid Volume:  Goal: Ability to achieve and maintain adequate urine output will improve  Description: Ability to achieve and maintain adequate urine output will improve  7/30/2021 0452 by Ynes Patten RN  Outcome: Ongoing  7/29/2021 1536 by Gaurav Riojas RN  Outcome: Ongoing     Problem: Respiratory:  Goal: Respiratory status will improve  Description: Respiratory status will improve  7/30/2021 0452 by Ynes Patten RN  Outcome: Ongoing  7/29/2021 1536 by Gaurav Riojas RN  Outcome: Ongoing     Problem: Falls - Risk of:  Goal: Will remain free from falls  Description: Will remain free from falls  Outcome: Ongoing  Goal: Absence of physical injury  Description: Absence of physical injury  Outcome: Ongoing

## 2021-07-30 NOTE — PROGRESS NOTES
Comprehensive Nutrition Assessment    Type and Reason for Visit:  Initial, Positive Nutrition Screen (Weight Loss, Poor Intakes/Appetite)    Nutrition Recommendations/Plan: Continue current diet. Will provide Ensure Enlive oral supplements with all meals. Encourage/monitor PO intakes as tolerated. Will monitor labs, weights, and plan of care. Nutrition Assessment:  Pt admitted with c/o SOB. PMH includes: COPD. Pt reports UBW of 120-125 lb - noted current weight of 120 lb. Pt reports recent decreased appetite for some time. States weight loss of several pounds over past few months. Pt reports she ate some of her breakfast.  Labs reviewed: K 3.2 mmol/L. Meds reviewed: Prednisone. Malnutrition Assessment:  Malnutrition Status: At risk for malnutrition    Context:  Chronic Illness     Findings of the 6 clinical characteristics of malnutrition:  Energy Intake:  7 - 75% or less estimated energy requirements for 1 month or longer  Weight Loss:  No significant weight loss     Body Fat Loss:  No significant body fat loss   Muscle Mass Loss:  No significant muscle mass loss  Fluid Accumulation:  No significant fluid accumulation   Strength:  Not Performed    Estimated Daily Nutrient Needs:  Energy (kcal):  28-32 kcal/kg = 1061-2397 kcals/day; Weight Used for Energy Requirements:  Admission     Protein (g):  1.2-1.5 gm/kg = 60-75 gm pro/day; Weight Used for Protein Requirements:  Ideal        Fluid (ml/day):  0546-7697 mL/day or per MD; Method Used for Fluid Requirements:  ml/Kg (30-35 mL/kg)      Nutrition Related Findings:  Labs/Meds reviewed. Wounds:  None       Current Nutrition Therapies:    ADULT DIET; Regular;  No Added Salt (3-4 gm)    Anthropometric Measures:  · Height: 5' 2\" (157.5 cm)  · Current Body Weight: 120 lb (54.4 kg)   · Admission Body Weight: 120 lb (54.4 kg)    · Usual Body Weight:  (120-125 lb per pt)     · Ideal Body Weight: 110 lbs; % Ideal Body Weight 109.1 %   · BMI: 21.9  · BMI Categories: Normal Weight (BMI 18.5-24. 9)       Nutrition Diagnosis:   · Inadequate oral intake related to  (decreased appetite) as evidenced by poor intake prior to admission, weight loss (variable PO intakes; need for ONS)    Nutrition Interventions:   Food and/or Nutrient Delivery:  Continue Current Diet, Start Oral Nutrition Supplement  Nutrition Education/Counseling:  No recommendation at this time   Coordination of Nutrition Care:  Continue to monitor while inpatient    Goals:  Oral intakes to meet % of estimated nutrition needs.        Nutrition Monitoring and Evaluation:   Food/Nutrient Intake Outcomes:  Food and Nutrient Intake, Supplement Intake  Physical Signs/Symptoms Outcomes:  Biochemical Data, GI Status, Fluid Status or Edema, Hemodynamic Status, Nutrition Focused Physical Findings, Skin, Weight     Electronically signed by Vesta Hunt RD, LD on 7/30/21 at 3:17 PM EDT    Contact: [phone]

## 2021-07-30 NOTE — PLAN OF CARE
Nutrition Problem #1: Inadequate oral intake  Intervention: Food and/or Nutrient Delivery: Continue Current Diet, Start Oral Nutrition Supplement  Nutritional Goals: Oral intakes to meet % of estimated nutrition needs.

## 2021-07-30 NOTE — PROGRESS NOTES
[facility]  Progress Note                   Date:   7/30/2021  Patient name: Ruthy Edmondson  Date of admission:  7/29/2021  3:58 AM  MRN:   [id number]  YOB: 1970  PCP: Crow Cousins    Reason for Admission: COPD (chronic obstructive pulmonary disease) ([facility] [address] [J44.9]    Subjective:       Clinical Changes /Abnormalities: Seen & examined in room. No acute CV issues/concerns overnight. Labs, vitals, & tele reviewed. Review of Systems    Medications:   Scheduled Meds:   azithromycin  250 mg Oral Daily    sodium chloride flush  5-40 mL Intravenous 2 times per day    famotidine  20 mg Oral BID    heparin (porcine)  5,000 Units Subcutaneous 3 times per day    predniSONE  20 mg Oral Daily    Followed by   Karis Pitt ON 8/1/2021] predniSONE  15 mg Oral Daily    Followed by   Karis Pitt ON 8/4/2021] predniSONE  10 mg Oral Daily    Followed by   Karis Pitt ON 8/7/2021] predniSONE  5 mg Oral Daily    guaiFENesin  600 mg Oral BID    amLODIPine  10 mg Oral Daily    lisinopril-hydroCHLOROthiazide  2 tablet Oral Daily     Continuous Infusions:   sodium chloride       CBC:   Recent Labs     07/29/21  0425 07/29/21  0522 07/30/21  0715   WBC 15.5* 14.2* 11.4*   HGB 13.6 13.0 13.9    308 297     BMP:    Recent Labs     07/29/21  0425 07/30/21  0715    135   K 4.1 3.2*    97*   CO2 22 24   BUN 10 6   CREATININE 0.60 0.59   GLUCOSE 97 88     Hepatic:  Recent Labs     07/29/21  0946   AST 29   ALT 26   BILITOT 0.34   ALKPHOS 73     Troponin:   Recent Labs     07/27/21  1608 07/29/21  0425   TROPHS 24* 11     BNP: No results for input(s): BNP in the last 72 hours. Lipids: No results for input(s): CHOL, HDL in the last 72 hours. Invalid input(s): LDLCALCU  INR: No results for input(s): INR in the last 72 hours.     Objective:   Vitals: /78   Pulse 71   Temp 98.7 °F (37.1 °C) (Oral)   Resp 16   Ht 5' 2\" (1.575 m)   Wt 120 lb (54.4 kg)   SpO2 95%   BMI 21.95 kg/m² General appearance: alert and cooperative with exam  HEENT: Head: Normocephalic, no lesions, without obvious abnormality. Neck:no JVD, trachea midline, no adenopathy  Lungs: Clear to auscultation  Heart: Regular rate and rhythm, s1/s2 auscultated, no murmurs  Abdomen: soft, non-tender, bowel sounds active  Extremities: no edema  Neurologic: not done    Cardiac Cath 7/29/21  LMCA: Normal 0% stenosis. LAD: Normal 0% stenosis. LCx: Normal 0% stenosis. RCA: Normal 0% stenosis.      The LV gram was performed in the MCGOWAN 30 position. LVEF: 40 %.       Conclusions:      Normal coronaries   [place] reduced LV function         Recommendations:      Medical treatment    Risk factors modification    Stop using recreational products     Echo 7/30/21  Summary  Left ventricle is normal in size, global left ventricular systolic function  is mild-moderately reduced, Visually EF 35-40%, by 3D Heart Model EF 42%. Abnormal Calculated global L. strain of -9.9 %, with apical sparing pattern. Grade I (mild) left ventricular diastolic dysfunction. Normal right ventricular size and reduced RV function based on 3d RVEF. Thickened mitral valve leaflets. Mild mitral regurgitation. Trivial tricuspid regurgitation. Trivial pulmonic insufficiency. Assessment / Acute Cardiac Problems:   1. Shortness of breath  2. NICMP with LVEF 40%  3. HTN  4. Hx of Alcohol abuse  5. Tobacco abuse    Patient Active Problem List:     Acute respiratory failure with hypoxia (HCC)     Hypertensive urgency     Sinus tachycardia     Hypokalemia     Smoking addiction     Alcohol abuse with intoxication ([address]     Diarrhea due to alcohol intake     Troponin level elevated     Shortness of breath     COPD (chronic obstructive pulmonary disease) (HCC)     Chest pain with high risk for cardiac etiology     Leukocytosis     Hypertension      Plan of Treatment:   1. Cath as above. Normal coronary arteries. NICMP with LVEF 35-40% on echo.  Continue GDMT with PO ACE & BB. RF modifications including tobacco and alcohol abuse cessation. PO ASA. 2. F/U in clinic in 2 weeks.      Electronically signed by TOMMY oHuston CNP on 7/30/2021 at 1:20 PM  [address]  [phone]

## 2021-07-30 NOTE — PROGRESS NOTES
Occupational [address]  Occupational Therapy Not Seen Note    DATE: 2021  Name: Tabatha Arora  : 1970  MRN: [id number]    Patient not available for Occupational Therapy due to:    Pt baseline independent with functional mobility and functional tasks per RN.  Pt with no OT acute care needs at this time, will defer OT eval.      Erum Prince S/OT

## 2021-07-31 NOTE — PROGRESS NOTES
Physician Progress Note      Ayan Turk  [facility] #:                  [id number]  :                       1970  ADMIT DATE:       2021 3:58 AM  DISCH DATE:        2021 5:27 PM  RESPONDING  PROVIDER #:        Zeferino Mccall          QUERY TEXT:    Patient admitted with SOB on exertion . Noted documentation of STEMI in H&P on   . In order to support the diagnosis of STEMI, please include additional   clinical indicators in your documentation. Or please document if the   diagnosis of STEMI has been ruled out after further study. The medical record reflects the following:  Risk Factors: Smoker , HTN,  Clinical Indicators: elevated troponin 33, 24,  EKG showed ST changes, cath   showed normal coronaries and EF of 40%. , /113>146/86. pro   Treatment: prinzide, norvasc,  received tapering dose of steroids. , monitoring   vitals and labs    Thank you, Please call if questions  Mariaelena Bingham RN CDS  [phone]  Options provided:  -- STEMI present as evidenced by, Please document evidence. -- STEMI was ruled out  -- Other - I will add my own diagnosis  -- Disagree - Not applicable / Not valid  -- Disagree - Clinically unable to determine / Unknown  -- Refer to Clinical Documentation Reviewer    PROVIDER RESPONSE TEXT:    STEMI was ruled out after study.     Query created by: Prosper Nix on 2021 11:17 AM      Electronically signed by:  Zeferino Mccall 2021 7:03 AM

## 2021-08-02 ENCOUNTER — TELEPHONE (OUTPATIENT)
Dept: PULMONOLOGY | Age: 51
End: 2021-08-02

## 2021-08-02 NOTE — CARE COORDINATION
8/2/2021 @ [address]  Patient called over the weekend to tell me that the doctor had not written a script for the nebulizer machine but she received the albuterol solution. I attempted to track down that doctor, they were not here. Dr. Yahaira Carvalho was on call and wrote the script. He is not the attending and there was no face to face done. Called Dr. Kandi Whitney office to see if Dr. Tiara March would   Write the face to face and the script.

## 2021-08-02 NOTE — TELEPHONE ENCOUNTER
Romeo Trejo from [facility] called. She stated that a resident signed the order for the patient to get a nebulizer, which they cant accept. It has to be from the ordering physician. Please create a new nebulizer order and sign. I will get it faxed to [id number] at [phone].  Thanks

## 2021-08-04 LAB
CULTURE: NORMAL
Lab: NORMAL
SPECIMEN DESCRIPTION: NORMAL

## 2021-11-12 ENCOUNTER — APPOINTMENT (OUTPATIENT)
Dept: ULTRASOUND IMAGING | Age: 51
End: 2021-11-12
Payer: MEDICARE

## 2021-11-12 ENCOUNTER — HOSPITAL ENCOUNTER (EMERGENCY)
Age: 51
Discharge: HOME | End: 2021-11-12
Attending: EMERGENCY MEDICINE
Payer: MEDICARE

## 2021-11-12 VITALS
RESPIRATION RATE: 18 BRPM | HEIGHT: 62 IN | DIASTOLIC BLOOD PRESSURE: 78 MMHG | HEART RATE: 87 BPM | SYSTOLIC BLOOD PRESSURE: 140 MMHG | BODY MASS INDEX: 21.35 KG/M2 | OXYGEN SATURATION: 98 % | WEIGHT: 116 LBS | TEMPERATURE: 98.2 F

## 2021-11-12 DIAGNOSIS — N93.8 DUB (DYSFUNCTIONAL UTERINE BLEEDING): Primary | ICD-10-CM

## 2021-11-12 DIAGNOSIS — N30.01 ACUTE CYSTITIS WITH HEMATURIA: ICD-10-CM

## 2021-11-12 LAB
-: NORMAL
ABSOLUTE EOS #: 0.12 K/UL (ref 0–0.44)
ABSOLUTE IMMATURE GRANULOCYTE: <0.03 K/UL (ref 0–0.3)
ABSOLUTE LYMPH #: 2.85 K/UL (ref 1.1–3.7)
ABSOLUTE MONO #: 0.72 K/UL (ref 0.1–1.2)
AMORPHOUS: NORMAL
ANION GAP SERPL CALCULATED.3IONS-SCNC: 11 MMOL/L (ref 9–17)
BACTERIA: NORMAL
BASOPHILS # BLD: 1 % (ref 0–2)
BASOPHILS ABSOLUTE: 0.04 K/UL (ref 0–0.2)
BILIRUBIN URINE: NEGATIVE
BUN BLDV-MCNC: 6 MG/DL (ref 6–20)
BUN/CREAT BLD: ABNORMAL (ref 9–20)
CALCIUM SERPL-MCNC: 8.8 MG/DL (ref 8.6–10.4)
CASTS UA: NORMAL /LPF (ref 0–8)
CHLORIDE BLD-SCNC: 104 MMOL/L (ref 98–107)
CO2: 23 MMOL/L (ref 20–31)
COLOR: YELLOW
COMMENT UA: ABNORMAL
CREAT SERPL-MCNC: 0.51 MG/DL (ref 0.5–0.9)
CRYSTALS, UA: NORMAL /HPF
DIFFERENTIAL TYPE: ABNORMAL
DIRECT EXAM: ABNORMAL
EOSINOPHILS RELATIVE PERCENT: 1 % (ref 1–4)
EPITHELIAL CELLS UA: NORMAL /HPF (ref 0–5)
GFR AFRICAN AMERICAN: >60 ML/MIN
GFR NON-AFRICAN AMERICAN: >60 ML/MIN
GFR SERPL CREATININE-BSD FRML MDRD: ABNORMAL ML/MIN/{1.73_M2}
GFR SERPL CREATININE-BSD FRML MDRD: ABNORMAL ML/MIN/{1.73_M2}
GLUCOSE BLD-MCNC: 111 MG/DL (ref 70–99)
GLUCOSE URINE: NEGATIVE
HCG QUALITATIVE: NEGATIVE
HCT VFR BLD CALC: 39.6 % (ref 36.3–47.1)
HEMOGLOBIN: 12.7 G/DL (ref 11.9–15.1)
IMMATURE GRANULOCYTES: 0 %
KETONES, URINE: NEGATIVE
LEUKOCYTE ESTERASE, URINE: ABNORMAL
LYMPHOCYTES # BLD: 34 % (ref 24–43)
Lab: ABNORMAL
MCH RBC QN AUTO: 29.9 PG (ref 25.2–33.5)
MCHC RBC AUTO-ENTMCNC: 32.1 G/DL (ref 28.4–34.8)
MCV RBC AUTO: 93.2 FL (ref 82.6–102.9)
MONOCYTES # BLD: 9 % (ref 3–12)
MUCUS: NORMAL
NITRITE, URINE: NEGATIVE
NRBC AUTOMATED: 0 PER 100 WBC
OTHER OBSERVATIONS UA: NORMAL
PDW BLD-RTO: 14.8 % (ref 11.8–14.4)
PH UA: 6 (ref 5–8)
PLATELET # BLD: 361 K/UL (ref 138–453)
PLATELET ESTIMATE: ABNORMAL
PMV BLD AUTO: 9.8 FL (ref 8.1–13.5)
POTASSIUM SERPL-SCNC: 3.3 MMOL/L (ref 3.7–5.3)
PROTEIN UA: NEGATIVE
RBC # BLD: 4.25 M/UL (ref 3.95–5.11)
RBC # BLD: ABNORMAL 10*6/UL
RBC UA: NORMAL /HPF (ref 0–4)
RENAL EPITHELIAL, UA: NORMAL /HPF
SEG NEUTROPHILS: 55 % (ref 36–65)
SEGMENTED NEUTROPHILS ABSOLUTE COUNT: 4.57 K/UL (ref 1.5–8.1)
SODIUM BLD-SCNC: 138 MMOL/L (ref 135–144)
SPECIFIC GRAVITY UA: 1.01 (ref 1–1.03)
SPECIMEN DESCRIPTION: ABNORMAL
TRICHOMONAS: NORMAL
TURBIDITY: CLEAR
URINE HGB: ABNORMAL
UROBILINOGEN, URINE: NORMAL
WBC # BLD: 8.3 K/UL (ref 3.5–11.3)
WBC # BLD: ABNORMAL 10*3/UL
WBC UA: NORMAL /HPF (ref 0–5)
YEAST: NORMAL

## 2021-11-12 PROCEDURE — 99285 EMERGENCY DEPT VISIT HI MDM: CPT

## 2021-11-12 PROCEDURE — 87660 TRICHOMONAS VAGIN DIR PROBE: CPT

## 2021-11-12 PROCEDURE — 81001 URINALYSIS AUTO W/SCOPE: CPT

## 2021-11-12 PROCEDURE — 80048 BASIC METABOLIC PNL TOTAL CA: CPT

## 2021-11-12 PROCEDURE — 84703 CHORIONIC GONADOTROPIN ASSAY: CPT

## 2021-11-12 PROCEDURE — 6370000000 HC RX 637 (ALT 250 FOR IP): Performed by: STUDENT IN AN ORGANIZED HEALTH CARE EDUCATION/TRAINING PROGRAM

## 2021-11-12 PROCEDURE — 87491 CHLMYD TRACH DNA AMP PROBE: CPT

## 2021-11-12 PROCEDURE — 93976 VASCULAR STUDY: CPT

## 2021-11-12 PROCEDURE — 87510 GARDNER VAG DNA DIR PROBE: CPT

## 2021-11-12 PROCEDURE — 87086 URINE CULTURE/COLONY COUNT: CPT

## 2021-11-12 PROCEDURE — 87591 N.GONORRHOEAE DNA AMP PROB: CPT

## 2021-11-12 PROCEDURE — 76830 TRANSVAGINAL US NON-OB: CPT

## 2021-11-12 PROCEDURE — 87480 CANDIDA DNA DIR PROBE: CPT

## 2021-11-12 PROCEDURE — 85025 COMPLETE CBC W/AUTO DIFF WBC: CPT

## 2021-11-12 RX ORDER — CEPHALEXIN 250 MG/1
500 CAPSULE ORAL ONCE
Status: COMPLETED | OUTPATIENT
Start: 2021-11-12 | End: 2021-11-12

## 2021-11-12 RX ORDER — CEPHALEXIN 500 MG/1
500 CAPSULE ORAL 2 TIMES DAILY
Qty: 10 CAPSULE | Refills: 0 | Status: SHIPPED | OUTPATIENT
Start: 2021-11-12 | End: 2021-11-17

## 2021-11-12 RX ADMIN — CEPHALEXIN 500 MG: 250 CAPSULE ORAL at 11:52

## 2021-11-12 ASSESSMENT — ENCOUNTER SYMPTOMS
SHORTNESS OF BREATH: 0
NAUSEA: 0
RHINORRHEA: 0
VOMITING: 0
COUGH: 0
ABDOMINAL PAIN: 0

## 2021-11-12 NOTE — ED PROVIDER NOTES
capsule by mouth 2 times daily for 5 days 11/12/21 11/17/21 Yes Mariusz Esteban MD   albuterol (PROVENTIL) (2.5 MG/3ML) 0.083% nebulizer solution Take 3 mLs by nebulization every 4 hours as needed for Wheezing 7/30/21   Ely Stephens MD   amLODIPine (NORVASC) 10 MG tablet Take 1 tablet by mouth daily 7/30/21   Ely Stephens MD   guaiFENesin (MUCINEX) 600 MG extended release tablet Take 1 tablet by mouth 2 times daily 7/30/21   Ely Stephens MD   ipratropium-albuterol (DUONEB) 0.5-2.5 (3) MG/3ML SOLN nebulizer solution Inhale 3 mLs into the lungs every 4 hours as needed for Shortness of Breath 7/30/21   Ely Stephens MD   tiotropium (SPIRIVA HANDIHALER) 18 MCG inhalation capsule Inhale 1 capsule into the lungs daily 7/30/21   Ely Stephens MD   aspirin 81 MG chewable tablet Take 1 tablet by mouth daily 7/31/21   Ely Stephens MD   carvedilol (COREG) 6.25 MG tablet Take 1 tablet by mouth 2 times daily (with meals) 7/30/21   Ely Stephens MD       REVIEW OF SYSTEMS    (2-9 systems for level 4, 10 or more for level 5)    Review of Systems   Constitutional: Negative for chills, fatigue and fever. HENT: Negative for congestion and rhinorrhea. Respiratory: Negative for cough and shortness of breath. Cardiovascular: Negative for chest pain. Gastrointestinal: Negative for abdominal pain, nausea and vomiting. Genitourinary: Positive for vaginal discharge. Negative for dysuria, vaginal bleeding and vaginal pain. Musculoskeletal: Negative for myalgias. Neurological: Negative for headaches. All other systems reviewed and are negative. PHYSICAL EXAM   (up to 7 for level 4, 8 or more for level 5)    INITIAL VITALS:   ED Triage Vitals [11/12/21 0811]   BP Temp Temp Source Pulse Resp SpO2 Height Weight   (!) 157/101 98.2 °F (36.8 °C) Oral 91 16 100 % 5' 2\" (1.575 m) 116 lb (52.6 kg)       Physical Exam  Vitals and nursing note reviewed. Exam conducted with a chaperone present. Constitutional:       General: She is not in acute distress. Appearance: She is well-developed. Cardiovascular:      Rate and Rhythm: Normal rate and regular rhythm. Pulses:           Radial pulses are 2+ on the right side and 2+ on the left side. Heart sounds: Normal heart sounds. No murmur heard. Pulmonary:      Effort: Pulmonary effort is normal. No respiratory distress. Breath sounds: Normal breath sounds. No decreased breath sounds. Abdominal:      General: There is no distension. Palpations: Abdomen is soft. Tenderness: There is no abdominal tenderness. Genitourinary:     Vagina: Normal.      Cervix: Cervical bleeding present. Adnexa:         Right: No tenderness. Left: No tenderness. Skin:     General: Skin is warm and dry. Capillary Refill: Capillary refill takes less than 2 seconds. Neurological:      Mental Status: She is alert and oriented to person, place, and time.          DIFFERENTIAL  DIAGNOSIS   PLAN (LABS / IMAGING / EKG):  Orders Placed This Encounter   Procedures    VAGINITIS DNA PROBE    C.trachomatis N.gonorrhoeae DNA    Culture, Urine    US NON OB TRANSVAGINAL    US DUP ABD PEL RETRO SCROT LIMITED    CBC WITH AUTO DIFFERENTIAL    BASIC METABOLIC PANEL    Urinalysis Reflex to Culture    HCG, SERUM, QUALITATIVE    Microscopic Urinalysis       MEDICATIONS ORDERED:  Orders Placed This Encounter   Medications    cephALEXin (KEFLEX) capsule 500 mg     Order Specific Question:   Antimicrobial Indications     Answer:   Urinary Tract Infection    cephALEXin (KEFLEX) 500 MG capsule     Sig: Take 1 capsule by mouth 2 times daily for 5 days     Dispense:  10 capsule     Refill:  0         DIAGNOSTIC RESULTS / EMERGENCYDEPARTMENT COURSE / MDM   LABS:  Labs Reviewed   CBC WITH AUTO DIFFERENTIAL - Abnormal; Notable for the following components:       Result Value    RDW 14.8 (*)     All other components within normal limits   BASIC METABOLIC PANEL - Abnormal; Notable for the following components:    Glucose 111 (*)     Potassium 3.3 (*)     All other components within normal limits   URINE RT REFLEX TO CULTURE - Abnormal; Notable for the following components:    Urine Hgb LARGE (*)     Leukocyte Esterase, Urine SMALL (*)     All other components within normal limits   VAGINITIS DNA PROBE   C.TRACHOMATIS N.GONORRHOEAE DNA   CULTURE, URINE   HCG, SERUM, QUALITATIVE   MICROSCOPIC URINALYSIS       RADIOLOGY:  US NON OB TRANSVAGINAL    Result Date: 11/12/2021  EXAMINATION: PELVIC ULTRASOUND; ULTRASOUND OF THE PELVIS WITH COLOR DOPPLER FLOW EVALUATION 11/12/2021 TECHNIQUE: Transvaginal pelvic duplex ultrasound using B-mode/gray scaled imaging, Doppler spectral analysis and color flow Doppler was obtained. COMPARISON: None HISTORY: ORDERING SYSTEM PROVIDED HISTORY: abnormal vaginal bleeding, concern for structural abnormality or neoplasm TECHNOLOGIST PROVIDED HISTORY: abnormal vaginal bleeding, concern for structural abnormality or neoplasm 52-year-old female with abnormal vaginal bleeding FINDINGS: Measurements: Patient's LMP is unknown. Uterus: 7.8 x 4.7 x 6.2 cm. Endometrial stripe: 9 mm. Right Ovary:6.4 x 6.4 x 5.7 cm Left Ovary: 3.6 x 1.7 x 1.5 cm Ultrasound Findings: Uterus: Heterogeneous echogenicity of the uterus. Hypoechoic structure in the right uterine body measuring 1.1 x 1.5 x 1.5 cm. Endometrial stripe: Endometrial stripe is abnormally enlarged in a postmenopausal female. There is also corresponding color flow. Right Ovary: Large echogenic structure in the right adnexa measuring 4.9 x 5.2 x 4.6 cm. There is associated shadowing marginal calcification. There is normal arterial and venous doppler flow in association with the peripheral right ovarian parenchyma. Left Ovary:  Left ovary is within normal limits. Complex tubular structure in the left adnexa with low-level internal echoes which could represent hydro salpinx or pyosalpinx. There is normal arterial and venous doppler flow in association with the left ovarian tissue. Free Fluid: No evidence of free fluid. 1. Large echogenic structure with associated shadowing marginal calcification which measures up to 5.2 cm which could represent a dermoid. Further evaluation with outpatient contrast-enhanced pelvic MRI recommended. 2. Endometrial stripe thickness measuring 9 mm, which is abnormally enlarged if the patient is postmenopausal. There is corresponding color flow. Further evaluation with endometrial sampling is recommended. 3. Complex tubular structure in the left adnexa with low level internal echoes which could represent hydrosalpinx or pyosalpinx. This can also be assessed further on MR pelvis with contrast. 4. Both ovaries demonstrate normal arterial and venous Doppler flow. 5. Probable right uterine body fibroid measuring 1.5 cm. US DUP ABD PEL RETRO SCROT LIMITED    Result Date: 11/12/2021  EXAMINATION: PELVIC ULTRASOUND; ULTRASOUND OF THE PELVIS WITH COLOR DOPPLER FLOW EVALUATION 11/12/2021 TECHNIQUE: Transvaginal pelvic duplex ultrasound using B-mode/gray scaled imaging, Doppler spectral analysis and color flow Doppler was obtained. COMPARISON: None HISTORY: ORDERING SYSTEM PROVIDED HISTORY: abnormal vaginal bleeding, concern for structural abnormality or neoplasm TECHNOLOGIST PROVIDED HISTORY: abnormal vaginal bleeding, concern for structural abnormality or neoplasm 71-year-old female with abnormal vaginal bleeding FINDINGS: Measurements: Patient's LMP is unknown. Uterus: 7.8 x 4.7 x 6.2 cm. Endometrial stripe: 9 mm. Right Ovary:6.4 x 6.4 x 5.7 cm Left Ovary: 3.6 x 1.7 x 1.5 cm Ultrasound Findings: Uterus: Heterogeneous echogenicity of the uterus. Hypoechoic structure in the right uterine body measuring 1.1 x 1.5 x 1.5 cm. Endometrial stripe: Endometrial stripe is abnormally enlarged in a postmenopausal female. There is also corresponding color flow.  Right Ovary: Large echogenic structure in the right adnexa measuring 4.9 x 5.2 x 4.6 cm. There is associated shadowing marginal calcification. There is normal arterial and venous doppler flow in association with the peripheral right ovarian parenchyma. Left Ovary:  Left ovary is within normal limits. Complex tubular structure in the left adnexa with low-level internal echoes which could represent hydro salpinx or pyosalpinx. There is normal arterial and venous doppler flow in association with the left ovarian tissue. Free Fluid: No evidence of free fluid. 1. Large echogenic structure with associated shadowing marginal calcification which measures up to 5.2 cm which could represent a dermoid. Further evaluation with outpatient contrast-enhanced pelvic MRI recommended. 2. Endometrial stripe thickness measuring 9 mm, which is abnormally enlarged if the patient is postmenopausal. There is corresponding color flow. Further evaluation with endometrial sampling is recommended. 3. Complex tubular structure in the left adnexa with low level internal echoes which could represent hydrosalpinx or pyosalpinx. This can also be assessed further on MR pelvis with contrast. 4. Both ovaries demonstrate normal arterial and venous Doppler flow. 5. Probable right uterine body fibroid measuring 1.5 cm. Impression:  Ongoing vaginal bleeding for nearly 3 weeks. Last menstrual cycle was over a year ago. Patient possibly perimenopausal with abnormal vaginal bleeding. However given patient's age, concern for structural lesion such as a fibroid, leiomyoma, or possible neoplasm. Therefore will obtain ultrasound, do a pelvic exam.  Also check urine. EMERGENCY DEPARTMENT COURSE:   Urine concerning for infection. Patient now states later on that she actually has been having some dysuria so we will treat with Keflex. Pelvic examination did reveal some dark oozing of blood from the cervix with no abnormal vaginal bleeding.   Adnexal tenderness not present. Ultrasound did demonstrate complex to be obstruction of the left adnexa as well as a possible dermoid cyst on the uterus. Either way this will need follow-up with an OB/GYN. Without any acute findings, no reason for admission at this time. Discussed with patient these findings and she states that she will call the emergency OB/GYN office either today or Monday for follow-up. Instructed patient to continue using pads and to return to the emergency room if she begins going through more than 2 an hour or begin having any severe symptoms. MDM  Number of Diagnoses or Management Options  Acute cystitis with hematuria: new, needed workup  DUB (dysfunctional uterine bleeding): new, needed workup     Amount and/or Complexity of Data Reviewed  Clinical lab tests: ordered and reviewed  Tests in the radiology section of CPT®: ordered and reviewed  Review and summarize past medical records: yes  Discuss the patient with other providers: yes  Independent visualization of images, tracings, or specimens: yes    Patient Progress  Patient progress: stable      PROCEDURES:  See PE for pelvic exam    CONSULTS:  None    CRITICAL CARE:  Please see attending note    FINAL IMPRESSION     1. DUB (dysfunctional uterine bleeding)    2. Acute cystitis with hematuria          DISPOSITION / PLAN   DISPOSITION Decision To Discharge 11/12/2021 11:39:34 AM      Evaluation and treatment course in the ED, and plan of care upon discharge was discussed in length with the patient. Patient had no further questions prior to being discharged and was instructed to return to the ED for new or worsening symptoms. Any changes to existing medications or new prescriptions were reviewed with patient and they expressed understanding of how to correctly take their medications and the possible side effects.     PATIENT REFERRED TO:  [facility] [address] [facility]  Schedule an appointment as soon as possible for a visit         DISCHARGE MEDICATIONS:  New Prescriptions    CEPHALEXIN (KEFLEX) 500 MG CAPSULE    Take 1 capsule by mouth 2 times daily for 5 days       Linda Robb DO  Emergency Medicine Resident Physician, PGY-3    (Please note that portions of this note were completed with a voice recognition program.  Efforts were made to edit the dictations but occasionally words are mis-transcribed.)          Linda Robb MD  11/12/21 [id number]

## 2021-11-12 NOTE — ED PROVIDER NOTES
[facility] ED  eMERGENCY dEPARTMENT eNCOUnter   Attending Attestation     Pt Name: Sarah Levin  MRN: [id number]  [place] 1970  Date of evaluation: 11/12/21       Sarah Levin is a 49 y.o. female who presents with Vaginal Bleeding (since last week of October, 3 pads a day. Pt. states was not having periods prior to this and was post menopausal)      History: Patient presents with vaginal bleeding for last 2 weeks. Patient has had no. Prior to that in the year. Patient has no other complaints at this time. Exam: Heart rate and rhythm are regular. Lungs are clear to station bilaterally. Abdomen is soft, nontender. Patient awake and alert and acting appropriately. Plan for vaginal ultrasound to see if there is any sign of mass. Will get pelvic exam and plan for appropriate followup or OB consult pending results. I performed a history and physical examination of the patient and discussed management with the resident. I reviewed the residents note and agree with the documented findings and plan of care. Any areas of disagreement are noted on the chart. I was personally present for the key portions of any procedures. I have documented in the chart those procedures where I was not present during the key portions. I have personally reviewed all images and agree with the resident's interpretation. I have reviewed the emergency nurses triage note. I agree with the chief complaint, past medical history, past surgical history, allergies, medications, social and family history as documented unless otherwise noted below. Documentation of the HPI, Physical Exam and Medical Decision Making performed by medical students or scribes is based on my personal performance of the HPI, PE and MDM.  For Phys Assistant/ Nurse Practitioner cases/documentation I have had a face to face evaluation of this patient and have completed at least one if not all key elements of the E/M (history, physical exam, and MDM). Additional findings are as noted. For APC cases I have personally evaluated and examined the patient in conjunction with the APC and agree with the treatment plan and disposition of the patient as recorded by the APC.     Kai Crowell MD  Attending Emergency  Physician       Deshaun Márquez MD  11/12/21 [id number]

## 2021-11-12 NOTE — ED NOTES
Pelvic exam set up, pt updated on plan of care. Encouraged to attempt to produce urine sample.       Ann Ricks RN  11/12/21 4022

## 2021-11-13 LAB
CULTURE: NORMAL
Lab: NORMAL
SPECIMEN DESCRIPTION: NORMAL

## 2021-11-15 LAB
C TRACH DNA GENITAL QL NAA+PROBE: NEGATIVE
N. GONORRHOEAE DNA: NEGATIVE
SPECIMEN DESCRIPTION: NORMAL

## 2022-03-02 ENCOUNTER — HOSPITAL ENCOUNTER (OUTPATIENT)
Age: 52
Setting detail: SPECIMEN
Discharge: HOME | End: 2022-03-02

## 2022-03-02 ENCOUNTER — OFFICE VISIT (OUTPATIENT)
Dept: OBGYN | Age: 52
End: 2022-03-02
Payer: MEDICARE

## 2022-03-02 VITALS
HEIGHT: 62 IN | DIASTOLIC BLOOD PRESSURE: 98 MMHG | HEART RATE: 74 BPM | BODY MASS INDEX: 22.45 KG/M2 | WEIGHT: 122 LBS | SYSTOLIC BLOOD PRESSURE: 172 MMHG

## 2022-03-02 DIAGNOSIS — Z12.31 SCREENING MAMMOGRAM FOR HIGH-RISK PATIENT: ICD-10-CM

## 2022-03-02 DIAGNOSIS — A59.09 TRICHOMONAL CERVICITIS: ICD-10-CM

## 2022-03-02 DIAGNOSIS — R19.00 PELVIC MASS: ICD-10-CM

## 2022-03-02 DIAGNOSIS — N95.0 PMB (POSTMENOPAUSAL BLEEDING): Primary | ICD-10-CM

## 2022-03-02 PROCEDURE — 3017F COLORECTAL CA SCREEN DOC REV: CPT | Performed by: STUDENT IN AN ORGANIZED HEALTH CARE EDUCATION/TRAINING PROGRAM

## 2022-03-02 PROCEDURE — 99211 OFF/OP EST MAY X REQ PHY/QHP: CPT | Performed by: OBSTETRICS & GYNECOLOGY

## 2022-03-02 PROCEDURE — 4004F PT TOBACCO SCREEN RCVD TLK: CPT | Performed by: STUDENT IN AN ORGANIZED HEALTH CARE EDUCATION/TRAINING PROGRAM

## 2022-03-02 PROCEDURE — 99213 OFFICE O/P EST LOW 20 MIN: CPT | Performed by: STUDENT IN AN ORGANIZED HEALTH CARE EDUCATION/TRAINING PROGRAM

## 2022-03-02 PROCEDURE — G8420 CALC BMI NORM PARAMETERS: HCPCS | Performed by: STUDENT IN AN ORGANIZED HEALTH CARE EDUCATION/TRAINING PROGRAM

## 2022-03-02 PROCEDURE — G8427 DOCREV CUR MEDS BY ELIG CLIN: HCPCS | Performed by: STUDENT IN AN ORGANIZED HEALTH CARE EDUCATION/TRAINING PROGRAM

## 2022-03-02 PROCEDURE — G8484 FLU IMMUNIZE NO ADMIN: HCPCS | Performed by: STUDENT IN AN ORGANIZED HEALTH CARE EDUCATION/TRAINING PROGRAM

## 2022-03-02 RX ORDER — LISINOPRIL 5 MG/1
5 TABLET ORAL DAILY
COMMUNITY

## 2022-03-03 DIAGNOSIS — B37.9 YEAST DETECTED: ICD-10-CM

## 2022-03-03 DIAGNOSIS — N76.0 BV (BACTERIAL VAGINOSIS): Primary | ICD-10-CM

## 2022-03-03 DIAGNOSIS — A59.9 TRICHIMONIASIS: ICD-10-CM

## 2022-03-03 DIAGNOSIS — B96.89 BV (BACTERIAL VAGINOSIS): Primary | ICD-10-CM

## 2022-03-03 LAB
C TRACH DNA GENITAL QL NAA+PROBE: NEGATIVE
CANDIDA SPECIES, DNA PROBE: POSITIVE
GARDNERELLA VAGINALIS, DNA PROBE: POSITIVE
N. GONORRHOEAE DNA: NEGATIVE
SOURCE: ABNORMAL
SPECIMEN DESCRIPTION: NORMAL
TRICHOMONAS VAGINALIS DNA: POSITIVE

## 2022-03-03 RX ORDER — METRONIDAZOLE 500 MG/1
500 TABLET ORAL 2 TIMES DAILY
Qty: 14 TABLET | Refills: 0 | Status: SHIPPED | OUTPATIENT
Start: 2022-03-03 | End: 2022-03-10

## 2022-03-03 RX ORDER — FLUCONAZOLE 150 MG/1
150 TABLET ORAL ONCE
Qty: 1 TABLET | Refills: 0 | Status: SHIPPED | OUTPATIENT
Start: 2022-03-03 | End: 2022-03-03

## 2022-03-09 NOTE — PROGRESS NOTES
Attending Physician Statement  I have discussed the care of Kin Mcnair, including pertinent history and exam findings,  with the resident. I have reviewed the key elements of all parts of the encounter with the resident. I agree with the assessment, plan and orders as documented by the resident.   (GE Modifier)    Cherelle Pierre,

## 2022-03-15 ENCOUNTER — TELEPHONE (OUTPATIENT)
Dept: OBGYN | Age: 52
End: 2022-03-15

## 2022-03-31 ENCOUNTER — TELEPHONE (OUTPATIENT)
Dept: OBGYN | Age: 52
End: 2022-03-31

## 2022-03-31 NOTE — TELEPHONE ENCOUNTER
NO VMB to leave a message about her culture results. She needs to be aware of BV and yeast and her medications.

## 2022-07-04 NOTE — PROGRESS NOTES
She denies dysuria. She denies urinary leaking. She denies vaginal discharge. REVIEW OF SYSTEMS:    Constitutional: negative fever, negative chills  HEENT: negative visual disturbances, negative headaches  Respiratory: negative dyspnea, negative cough  Cardiovascular: negative chest pain,  negative palpitations  Gastrointestinal: negative abdominal pain, negative RUQ pain, negative N/V, negative diarrhea, negative constipation  Genitourinary: negative dysuria, negative vaginal discharge, negative vaginal bleeding  Dermatological: negative rash, negative wounds  Hematologic: negative bleeding/clotting disorder  Immunologic: negative recent illness, negative recent sick contact, negative allergic reactions  Lymphatic: negative lymph nodes  Musculoskeletal: negative back pain, negative myalgias, negative arthralgias  Neurological:  negative dizziness, negative weakness  Behavior/Psych: negative depression, negative anxiety      GYNECOLOGICAL HISTORY:  Age of Menopause: 53    Sexually Active: has sex with males  STD History: Trichomonas 2021    Pap History: Last PAP was normal; 2018.   Colposcopy History: denies    Permanent Sterilization: no  Reversible Birth Control: no  Hormone Replacement Exposure: no    OBSTETRICAL HISTORY:  OB History    Para Term  AB Living   2         2   SAB IAB Ectopic Molar Multiple Live Births                    # Outcome Date GA Lbr Madan/2nd Weight Sex Delivery Anes PTL Lv   2             1                 PAST MEDICAL HISTORY:      Diagnosis Date    High blood pressure        PAST SURGICAL HISTORY:                                                                    Procedure Laterality Date    CARPAL TUNNEL RELEASE         MEDICATIONS:  Current Outpatient Medications   Medication Sig Dispense Refill    lisinopril (PRINIVIL;ZESTRIL) 5 MG tablet Take 5 mg by mouth daily      albuterol (PROVENTIL) (2.5 MG/3ML) 0.083% nebulizer solution Take 3 mLs by nebulization every 4 hours as needed for Wheezing 120 each 3    amLODIPine (NORVASC) 10 MG tablet Take 1 tablet by mouth daily 30 tablet 3    ipratropium-albuterol (DUONEB) 0.5-2.5 (3) MG/3ML SOLN nebulizer solution Inhale 3 mLs into the lungs every 4 hours as needed for Shortness of Breath 360 mL 0    tiotropium (SPIRIVA HANDIHALER) 18 MCG inhalation capsule Inhale 1 capsule into the lungs daily 90 capsule 1    guaiFENesin (MUCINEX) 600 MG extended release tablet Take 1 tablet by mouth 2 times daily (Patient not taking: Reported on 3/2/2022) 10 tablet 0    aspirin 81 MG chewable tablet Take 1 tablet by mouth daily (Patient not taking: Reported on 3/2/2022) 30 tablet 3    carvedilol (COREG) 6.25 MG tablet Take 1 tablet by mouth 2 times daily (with meals) (Patient not taking: Reported on 3/2/2022) 60 tablet 1     No current facility-administered medications for this visit. ALLERGIES:  Allergies as of 03/02/2022    (No Known Allergies)                                   VITALS:  Vitals:    03/02/22 1329 03/02/22 1410   BP: (!) 160/97 (!) 172/98   Pulse: 87 74   Weight: 122 lb (55.3 kg)    Height: 5' 2\" (1.575 m)                                                                                                                                                                          PHYSICAL EXAM:   General Appearance: Appears healthy. Alert; in no acute distress. Pleasant. Skin: Skin color, texture, turgor normal. No rashes or lesions. Lymphatic: No abnormally enlarged lymph nodes. HEENT: normocephalic and atraumatic, Thyroid normal to inspection and palpation  Respiratory: Normal expansion. Clear to auscultation. No rales, rhonchi, or wheezing.   Cardiovascular: normal rate, normal S1 and S2, no gallops, intact distal pulses and no carotid bruits  Breast: non-tender, no masses, lesions, dimpling, or galactorrhea   Abdomen: soft, non-tender, non-distended, no right upper quadrant tenderness and no CVA tenderness  Pelvic Exam:   Chaperone for Intimate Exam: Chaperone was present for entire exam, Chaperone Name: Briseyda Judd  External genitalia: General appearance; normal, Hair distribution; normal, Lesions absent  Urinary system: urethral meatus normal  Vaginal: normal mucosa, no discharge  Cervix: normal appearing cervix without discharge or lesions  Adnexa: normal adnexa in size, nontender and no masses  Uterus: normal single, nontender  Rectal Exam: exam declined by patient  Musculoskeletal: no gross abnormalities  Extremities: non-tender BLE and non-edematous  Psych:  oriented to time, place and person       DATA:  No results found for this visit on 03/02/22. ASSESSMENT & PLAN:    Zeb Talbert is a 49 y.o. female    -Elevated blood pressure x2, otherwise normal vitals. Patient denies any headache vision changes or chest pain   -Patient presents with a follow-up from postmenopausal bleeding. Offered the patient to complete EMB at this time and patient declined.   -We will schedule patient for EMB at a follow-up visit   -We will order tumor markers for 5.2 cm ovarian mass. Will order AFP, LDH, CA-125, CEA, CA 19-9, inhibin A/B   -We will order pelvic MRI with contrast for further evaluation of pelvic mass   -Patient states that she cannot stay for blood work at this time and will complete at next appointment. - Mammogram ordered. Last mammogram was in 2018 and was BI-RADS 1   - 6/2018 : Negative Pap with negative HPV   - SSE: unremarkable.  No bleeding or lesions   - Vaginitis Probe and Gc/c collected    Patient Active Problem List    Diagnosis Date Noted    Leukocytosis 07/30/2021    Hypertension 07/30/2021    COPD (chronic obstructive pulmonary disease) ([facility] 07/29/2021    Shortness of breath     Hypertensive urgency 07/27/2021    Sinus tachycardia 07/27/2021    Hypokalemia 07/27/2021    Smoking addiction 07/27/2021    Alcohol abuse with intoxication ([facility] 07/27/2021    Diarrhea due to alcohol intake 07/27/2021    Troponin level elevated 07/27/2021       Return in about 2 weeks (around 3/16/2022) for EMB . Counseling Completed:  discussed need for repeat pap every 5 years, if negative. discussed need for mammograms every 1 year, If >42 yo and last mammogram was negative. discussed Calcium and Vitamin D dosing. discussed need for colonoscopy screening as well as onset for bone density testing. discussed birth control and barrier recommendations. discussed STD counseling and prevention. discussed Gardisil counseling for all patients 9-25 yo. Hereditary Breast, Ovarian, Colon and Uterine Cancer screening discussed. Tobacco & Secondary smoke risks discussed; with recommendation for cessation and avoidance. Routine health maintenance per patients PCP discussed. Patient was seen with total face to face time of 15 minutes. More than 50% of this visit was on counseling and education regarding her diagnose(s) as listed below and options. She was also counseled on her preventative health maintenance recommendations and follow-up. Diagnosis Orders   1. Screening mammogram for high-risk patient     2. PMB (postmenopausal bleeding)     3. Pelvic mass      AFP Tumor Marker    Lactate Dehydrogenase    CEA    Cancer Antigen 19-9    Inhibin A    MRI PELVIS W CONTRAST    Inhibin B   4.  Trichomonal cervicitis  Vaginitis DNA Probe    Chlamydia Trachomatis & Neisseria gonorrhoeae (GC) by amplified detection       Chris Armendariz DO  Ob/Gyn Resident  [facility], [facility]  3/2/2022, 3:33 PM Yes - the patient is able to be screened Information: Selecting Yes will display possible errors in your note based on the variables you have selected. This validation is only offered as a suggestion for you. PLEASE NOTE THAT THE VALIDATION TEXT WILL BE REMOVED WHEN YOU FINALIZE YOUR NOTE. IF YOU WANT TO FAX A PRELIMINARY NOTE YOU WILL NEED TO TOGGLE THIS TO 'NO' IF YOU DO NOT WANT IT IN YOUR FAXED NOTE.

## 2022-07-05 ENCOUNTER — HOSPITAL ENCOUNTER (OUTPATIENT)
Age: 52
Discharge: HOME | End: 2022-07-05
Payer: MEDICARE

## 2022-07-05 LAB
ABSOLUTE EOS #: <0.03 K/UL (ref 0–0.44)
ABSOLUTE IMMATURE GRANULOCYTE: 0.03 K/UL (ref 0–0.3)
ABSOLUTE LYMPH #: 2.22 K/UL (ref 1.1–3.7)
ABSOLUTE MONO #: 0.44 K/UL (ref 0.1–1.2)
ALBUMIN SERPL-MCNC: 4.7 G/DL (ref 3.5–5.2)
ALBUMIN/GLOBULIN RATIO: 1.6 (ref 1–2.5)
ALP BLD-CCNC: 111 U/L (ref 35–104)
ALT SERPL-CCNC: 12 U/L (ref 5–33)
ANION GAP SERPL CALCULATED.3IONS-SCNC: 11 MMOL/L (ref 9–17)
AST SERPL-CCNC: 16 U/L
BASOPHILS # BLD: 0 % (ref 0–2)
BASOPHILS ABSOLUTE: 0.03 K/UL (ref 0–0.2)
BILIRUB SERPL-MCNC: 0.18 MG/DL (ref 0.3–1.2)
BUN BLDV-MCNC: 14 MG/DL (ref 6–20)
CALCIUM SERPL-MCNC: 9.8 MG/DL (ref 8.6–10.4)
CHLORIDE BLD-SCNC: 103 MMOL/L (ref 98–107)
CO2: 22 MMOL/L (ref 20–31)
CREAT SERPL-MCNC: 0.7 MG/DL (ref 0.5–0.9)
EOSINOPHILS RELATIVE PERCENT: 0 % (ref 1–4)
GFR AFRICAN AMERICAN: >60 ML/MIN
GFR NON-AFRICAN AMERICAN: >60 ML/MIN
GFR SERPL CREATININE-BSD FRML MDRD: ABNORMAL ML/MIN/{1.73_M2}
GLUCOSE BLD-MCNC: 86 MG/DL (ref 70–99)
HAV IGM SER IA-ACNC: NONREACTIVE
HCG QUANTITATIVE: <1 MIU/ML
HCT VFR BLD CALC: 48.1 % (ref 36.3–47.1)
HEMOGLOBIN: 15 G/DL (ref 11.9–15.1)
HEPATITIS B CORE IGM ANTIBODY: NONREACTIVE
HEPATITIS B SURFACE ANTIGEN: NONREACTIVE
HEPATITIS C ANTIBODY: NONREACTIVE
HIV AG/AB: NONREACTIVE
IMMATURE GRANULOCYTES: 0 %
LYMPHOCYTES # BLD: 27 % (ref 24–43)
MCH RBC QN AUTO: 28.8 PG (ref 25.2–33.5)
MCHC RBC AUTO-ENTMCNC: 31.2 G/DL (ref 28.4–34.8)
MCV RBC AUTO: 92.3 FL (ref 82.6–102.9)
MONOCYTES # BLD: 5 % (ref 3–12)
NRBC AUTOMATED: 0 PER 100 WBC
PDW BLD-RTO: 13.2 % (ref 11.8–14.4)
PLATELET # BLD: 359 K/UL (ref 138–453)
PMV BLD AUTO: 10 FL (ref 8.1–13.5)
POTASSIUM SERPL-SCNC: 4.4 MMOL/L (ref 3.7–5.3)
RBC # BLD: 5.21 M/UL (ref 3.95–5.11)
SEG NEUTROPHILS: 68 % (ref 36–65)
SEGMENTED NEUTROPHILS ABSOLUTE COUNT: 5.63 K/UL (ref 1.5–8.1)
SODIUM BLD-SCNC: 136 MMOL/L (ref 135–144)
TOTAL PROTEIN: 7.7 G/DL (ref 6.4–8.3)
WBC # BLD: 8.4 K/UL (ref 3.5–11.3)

## 2022-07-05 PROCEDURE — 80053 COMPREHEN METABOLIC PANEL: CPT

## 2022-07-05 PROCEDURE — 93005 ELECTROCARDIOGRAM TRACING: CPT | Performed by: FAMILY MEDICINE

## 2022-07-05 PROCEDURE — 86480 TB TEST CELL IMMUN MEASURE: CPT

## 2022-07-05 PROCEDURE — 80074 ACUTE HEPATITIS PANEL: CPT

## 2022-07-05 PROCEDURE — 87389 HIV-1 AG W/HIV-1&-2 AB AG IA: CPT

## 2022-07-05 PROCEDURE — 84702 CHORIONIC GONADOTROPIN TEST: CPT

## 2022-07-05 PROCEDURE — 85025 COMPLETE CBC W/AUTO DIFF WBC: CPT

## 2022-07-05 PROCEDURE — 36415 COLL VENOUS BLD VENIPUNCTURE: CPT

## 2022-07-06 LAB
EKG ATRIAL RATE: 67 BPM
EKG P AXIS: 71 DEGREES
EKG P-R INTERVAL: 142 MS
EKG Q-T INTERVAL: 440 MS
EKG QRS DURATION: 92 MS
EKG QTC CALCULATION (BAZETT): 464 MS
EKG R AXIS: 65 DEGREES
EKG T AXIS: 7 DEGREES
EKG VENTRICULAR RATE: 67 BPM

## 2022-07-06 PROCEDURE — 93010 ELECTROCARDIOGRAM REPORT: CPT | Performed by: INTERNAL MEDICINE

## 2022-07-07 LAB
QUANTI TB GOLD PLUS: NEGATIVE
QUANTI TB1 MINUS NIL: 0 IU/ML (ref 0–0.34)
QUANTI TB2 MINUS NIL: 0 IU/ML (ref 0–0.34)
QUANTIFERON MITOGEN: >10 IU/ML
QUANTIFERON NIL: 0.02 IU/ML

## 2023-02-17 ENCOUNTER — TELEPHONE (OUTPATIENT)
Dept: OBGYN | Age: 53
End: 2023-02-17

## 2023-02-17 NOTE — TELEPHONE ENCOUNTER
Patient is due for annual and also no showed EMB 3/2022. Left voicemail to call the office back so we could get her scheduled.

## 2023-02-17 NOTE — PROGRESS NOTES
Comprehensive Nutrition Assessment    Type and Reason for Visit:  Initial, Positive Nutrition Screen (Weight Loss, Poor Appetite/Intake, Chewing/Swallowing Difficulty)    Nutrition Recommendations/Plan:   - Continue Regular, Low Sodium Diet as tolerated  - Start high calorie oral nutrition supplements (Ensure Enlive) and provide 3x/d   - Monitor/encourage PO intakes     Nutrition Assessment:  Patient seen for report of weight loss, poor appetite/intake, and difficulty with chewing/swallowing. Spoke with patient who reports that her appetite is \"not that good. \" Stated that she did not eat lunch today. Reports a weight loss of 3 to 4 lbs over the last \"couple months,\" which is not considered significant. Patient states that her usual body weight is 120-125 lbs. Denies nausea/vomiting. Denies any difficulty with chewing/swallowing. Agreeable to receiving chocolate Ensure ONS. Malnutrition Assessment:  Malnutrition Status: At risk for malnutrition     Context:  Chronic Illness     Findings of the 6 clinical characteristics of malnutrition:  Energy Intake:  7 - 75% or less estimated energy requirements for 1 month or longer  Weight Loss:  No significant weight loss     Body Fat Loss:  No significant body fat loss     Muscle Mass Loss:  No significant muscle mass loss    Fluid Accumulation:  No significant fluid accumulation     Strength:  Not Performed    Estimated Daily Nutrient Needs:  Energy (kcal):  8533-7319 kcal/d (27-30 kcal/kg); Weight Used for Energy Requirements:  Current     Protein (g):  70-80 g protein/d (1.2-1.4 g/kg); Weight Used for Protein Requirements:  Current        Fluid (ml/day):  1700 mL fluid/d or per MD; Method Used for Fluid Requirements:  ml/Kg (30 mL)      Nutrition Related Findings:  Labs/Meds reviewed. Date of last BM unknown. Wounds:  None       Current Nutrition Therapies:    ADULT DIET; Regular;  Low Sodium (2 gm)    Anthropometric Measures:  · Height: 5' 2\" (157.5 cm)  · Current Body Weight: 127 lb (57.6 kg)   · Admission Body Weight: 125 lb (56.7 kg) (Stated)    · Usual Body Weight:  120-125 lbs per Patient     · Ideal Body Weight: 110 lbs; % Ideal Body Weight 115.5 %   · BMI: 23.2  · Adjusted Body Weight:  No Adjustment   · BMI Categories: Normal Weight (BMI 18.5-24. 9)       Nutrition Diagnosis:   · Unintended weight loss related to inadequate protein-energy intake, poor appetite as evidenced by poor intake prior to admission, weight loss    Nutrition Interventions:   Food and/or Nutrient Delivery:  Continue Current Diet, Start Oral Nutrition Supplement  Nutrition Education/Counseling:  No recommendation at this time, Education not indicated   Coordination of Nutrition Care:  Continue to monitor while inpatient    Goals:  PO intake to meet > 75% of estimated nutrition needs       Nutrition Monitoring and Evaluation:   Behavioral-Environmental Outcomes:  None Identified   Food/Nutrient Intake Outcomes:  Food and Nutrient Intake, Supplement Intake  Physical Signs/Symptoms Outcomes:  Biochemical Data, GI Status, Fluid Status or Edema, Nutrition Focused Physical Findings, Weight, Skin     Discharge Planning:     Too soon to determine     Electronically signed by Chelly Pollock RD, LD on 7/28/21 at 2:52 PM EDT    Contact: [phone]

## 2023-04-03 NOTE — DISCHARGE SUMMARY
5 WEEKS POSTOPERATIVE, PHACOEMULSIFICATION WITH INTRAOCULAR LENS,  LEFT EYE:    SUBJECTIVE: vision improving, intermittent dryness os    OBJECTIVE:   SLIT LAMP EXAM                                                                   TA:   16/16         LIDS, LASHES AND LACRIMAL:  Right eye: dermatochalasis left eye: dermatochalasis        CONJUNCTIVA AND SCLERA:  Right eye: clear  //left eye: clear           CORNEA: clear and temporal clear cornea wound, well healed  //left eye:  clear and temporal clear cornea wound, well healed        ANTERIOR CHAMBER:  Right eye: deep and quiet  // left eye: deep and quiet        IRIS:  Right eye: round and regular without neovascularization  //left eye: round and regular without neovascularization        LENS: PC IOL, well centered, stable  //left eye: PC IOL, well centered, stable    ASSESSMENT:   4 WEEKS POSTOP, PHACOEMULSIFICATION WITH INTRAOCULAR LENS, LEFT EYE:  doing great, no problems.    PLAN:     1. No Rx for glasses needed--use OTC readers as instructed.   2. ANNUAL CHECK WITH OPTOMETRY, or immediatly as needed as instructed.    [address]     Department of Internal Medicine - Staff Internal Medicine Teaching Service    INPATIENT DISCHARGE SUMMARY      Patient Identification:  Марина Saul is a 49 y.o. female. :  1970  MRN: [id number]     Acct: [de-identified]   PCP: Kassi Anne  Admit Date:  2021  Discharge date and time: 2021  5:27 PM   Attending Provider: No att. providers found                                     [address] Problem Lists:  Principal Problem:    COPD (chronic obstructive pulmonary disease) ([facility] [address]  Active Problems:    Smoking addiction    Leukocytosis    Hypertension  Resolved Problems:    Acute respiratory failure with hypoxia (HCC)    Chest pain with high risk for cardiac etiology      HOSPITAL STAY     Brief Inpatient course:   Марина Saul is a 49 y.o. female who was admitted for the management of COPD (chronic obstructive pulmonary disease) ([facility] presented to the emergency department with shortness of breath and found to have respiratory failure with hypoxia. Patient admitted to inpatient status for management of acute respiratory failure.        Procedures/ Significant Interventions:    None     Consults:     Consults:     Final Specialist Recommendations/Findings:   IP CONSULT TO INTERNAL MEDICINE  IP CONSULT TO CASE MANAGEMENT      Any Hospital Acquired Infections: none    Discharge Functional Status:  stable    DISCHARGE PLAN     Disposition: home    Patient Instructions:   Discharge Medication List as of 2021  4:14 PM      START taking these medications    Details   albuterol (PROVENTIL) (2.5 MG/3ML) 0.083% nebulizer solution Take 3 mLs by nebulization every 4 hours as needed for Wheezing, Disp-120 each, R-3Normal      amLODIPine (NORVASC) 10 MG tablet Take 1 tablet by mouth daily, Disp-30 tablet, R-3Normal      guaiFENesin (MUCINEX) 600 MG extended release tablet Take 1 tablet by mouth 2 times daily, Disp-10 tablet, R-0Normal ipratropium-albuterol (DUONEB) 0.5-2.5 (3) MG/3ML SOLN nebulizer solution Inhale 3 mLs into the lungs every 4 hours as needed for Shortness of Breath, Disp-360 mL, R-0Normal      tiotropium (SPIRIVA HANDIHALER) 18 MCG inhalation capsule Inhale 1 capsule into the lungs daily, Disp-90 capsule, R-1Normal      aspirin 81 MG chewable tablet Take 1 tablet by mouth daily, Disp-30 tablet, R-3Normal      azithromycin (ZITHROMAX) 250 MG tablet Take 1 tablet by mouth daily for 10 days, Disp-10 tablet, R-0Normal      predniSONE (DELTASONE) 20 MG tablet Take 1 tablet by mouth 2 times daily for 5 days, Disp-10 tablet, R-0Normal      carvedilol (COREG) 6.25 MG tablet Take 1 tablet by mouth 2 times daily (with meals), Disp-60 tablet, R-1Normal         STOP taking these medications       lisinopril-hydroCHLOROthiazide (PRINZIDE;ZESTORETIC) 10-12.5 MG per tablet Comments:   Reason for Stopping:               Activity: activity as tolerated    Diet: regular diet    Follow-up:    Martínez Velazquez  [address] [facility] [id number] [phone]    Schedule an appointment as soon as possible for a visit in 1 week  As needed, If symptoms worsen    Blanca Salamanca MD  [place] [address]  [address] [facility] [phone]    Schedule an appointment as soon as possible for a visit in 2 weeks  peter , sleep study    [facility]  [address] [place] [address]  [phone]  Schedule an appointment as soon as possible for a visit on 8/17/2021  at 1:30pm for hospitl f/u with cardiology    Claudean Sale, MD  [address] [facility] [address]  [address]  [phone]    Call in 1 week        Patient Instructions: It is important STOP smoking. Continue Spiriva , DUONEB and albuterol . Follow up with PCP and cardiology on discharge. Get a sleep study with pulmonary follow up. Start  Coreg 6.125 mg twice daily and amlodipine 10 mg daily and f/u with PCP for HTN management.    Continue rest of medications as prescribed. Follow up labs: None   Follow up imaging: None    Note that over 30 minutes was spent in preparing discharge papers, discussing discharge with patient, medication review, etc.      Sanna Jones MD  Internal Medicine Resident, PGY-1  [address], [place]  8/3/2021, 4:08 PM

## 2025-05-02 ENCOUNTER — HOSPITAL ENCOUNTER (INPATIENT)
Age: 55
LOS: 3 days | Discharge: HOME | DRG: 135 | End: 2025-05-05
Attending: EMERGENCY MEDICINE | Admitting: SURGERY
Payer: MEDICAID

## 2025-05-02 ENCOUNTER — APPOINTMENT (OUTPATIENT)
Dept: CT IMAGING | Age: 55
DRG: 135 | End: 2025-05-02
Payer: MEDICAID
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
## 2025-05-02 DIAGNOSIS — S22.41XA CLOSED FRACTURE OF MULTIPLE RIBS OF RIGHT SIDE, INITIAL ENCOUNTER: ICD-10-CM

## 2025-05-02 DIAGNOSIS — S19.9XXA INJURY OF NECK, INITIAL ENCOUNTER: Primary | ICD-10-CM

## 2025-05-02 LAB
ABO + RH BLD: NORMAL
ANION GAP SERPL CALCULATED.3IONS-SCNC: 17 MMOL/L (ref 9–16)
ARM BAND NUMBER: NORMAL
BLOOD BANK SAMPLE EXPIRATION: NORMAL
BLOOD BANK SPECIMEN: ABNORMAL
BLOOD GROUP ANTIBODIES SERPL: NEGATIVE
BODY TEMPERATURE: 37
BUN SERPL-MCNC: 5 MG/DL (ref 6–20)
CHLORIDE SERPL-SCNC: 101 MMOL/L (ref 98–107)
CO2 SERPL-SCNC: 20 MMOL/L (ref 20–31)
COHGB MFR BLD: 7.9 % (ref 0–5)
CREAT SERPL-MCNC: 0.8 MG/DL (ref 0.6–0.9)
ERYTHROCYTE [DISTWIDTH] IN BLOOD BY AUTOMATED COUNT: 13.8 % (ref 11.8–14.4)
ETHANOL PERCENT: 0.23 %
ETHANOLAMINE SERPL-MCNC: 232 MG/DL (ref 0–0.08)
FIO2 ON VENT: ABNORMAL %
GFR, ESTIMATED: 50 ML/MIN/1.73M2
GLUCOSE SERPL-MCNC: 107 MG/DL (ref 74–99)
HCG SERPL QL: NEGATIVE
HCO3 VENOUS: 19.1 MMOL/L (ref 24–30)
HCT VFR BLD AUTO: 36.5 % (ref 36.3–47.1)
HGB BLD-MCNC: 12.2 G/DL (ref 11.9–15.1)
INR PPP: 1
MCH RBC QN AUTO: 28.2 PG (ref 25.2–33.5)
MCHC RBC AUTO-ENTMCNC: 33.4 G/DL (ref 28.4–34.8)
MCV RBC AUTO: 84.3 FL (ref 82.6–102.9)
NEGATIVE BASE EXCESS, VEN: 5.2 MMOL/L (ref 0–2)
NRBC BLD-RTO: 0 PER 100 WBC
O2 SAT, VEN: 99 % (ref 60–85)
PARTIAL THROMBOPLASTIN TIME: 24.8 SEC (ref 23–36.5)
PCO2 VENOUS: 33.5 MM HG (ref 39–55)
PH VENOUS: 7.37 (ref 7.32–7.42)
PLATELET # BLD AUTO: 345 K/UL (ref 138–453)
PMV BLD AUTO: 9.5 FL (ref 8.1–13.5)
PO2 VENOUS: 155.9 MM HG (ref 30–50)
POTASSIUM SERPL-SCNC: 2.7 MMOL/L (ref 3.7–5.3)
PROLACTIN SERPL-MCNC: 45.2 NG/ML (ref 4.79–23.3)
PROTHROMBIN TIME: 13.6 SEC (ref 11.7–14.9)
RBC # BLD AUTO: 4.33 M/UL (ref 3.95–5.11)
SODIUM SERPL-SCNC: 138 MMOL/L (ref 136–145)
WBC OTHER # BLD: 7.8 K/UL (ref 3.5–11.3)

## 2025-05-02 PROCEDURE — 85027 COMPLETE CBC AUTOMATED: CPT

## 2025-05-02 PROCEDURE — 86901 BLOOD TYPING SEROLOGIC RH(D): CPT

## 2025-05-02 PROCEDURE — 82565 ASSAY OF CREATININE: CPT

## 2025-05-02 PROCEDURE — 82805 BLOOD GASES W/O2 SATURATION: CPT

## 2025-05-02 PROCEDURE — 72125 CT NECK SPINE W/O DYE: CPT

## 2025-05-02 PROCEDURE — 96366 THER/PROPH/DIAG IV INF ADDON: CPT

## 2025-05-02 PROCEDURE — 86900 BLOOD TYPING SEROLOGIC ABO: CPT

## 2025-05-02 PROCEDURE — 80051 ELECTROLYTE PANEL: CPT

## 2025-05-02 PROCEDURE — 96375 TX/PRO/DX INJ NEW DRUG ADDON: CPT

## 2025-05-02 PROCEDURE — 84703 CHORIONIC GONADOTROPIN ASSAY: CPT

## 2025-05-02 PROCEDURE — 70496 CT ANGIOGRAPHY HEAD: CPT

## 2025-05-02 PROCEDURE — 6360000004 HC RX CONTRAST MEDICATION

## 2025-05-02 PROCEDURE — 84146 ASSAY OF PROLACTIN: CPT

## 2025-05-02 PROCEDURE — 2580000003 HC RX 258

## 2025-05-02 PROCEDURE — 71260 CT THORAX DX C+: CPT

## 2025-05-02 PROCEDURE — 85730 THROMBOPLASTIN TIME PARTIAL: CPT

## 2025-05-02 PROCEDURE — 96376 TX/PRO/DX INJ SAME DRUG ADON: CPT

## 2025-05-02 PROCEDURE — 86850 RBC ANTIBODY SCREEN: CPT

## 2025-05-02 PROCEDURE — 6360000002 HC RX W HCPCS

## 2025-05-02 PROCEDURE — 99223 1ST HOSP IP/OBS HIGH 75: CPT | Performed by: SURGERY

## 2025-05-02 PROCEDURE — 96372 THER/PROPH/DIAG INJ SC/IM: CPT

## 2025-05-02 PROCEDURE — 84520 ASSAY OF UREA NITROGEN: CPT

## 2025-05-02 PROCEDURE — 90715 TDAP VACCINE 7 YRS/> IM: CPT

## 2025-05-02 PROCEDURE — 90471 IMMUNIZATION ADMIN: CPT

## 2025-05-02 PROCEDURE — 96365 THER/PROPH/DIAG IV INF INIT: CPT

## 2025-05-02 PROCEDURE — G0480 DRUG TEST DEF 1-7 CLASSES: HCPCS

## 2025-05-02 PROCEDURE — 96368 THER/DIAG CONCURRENT INF: CPT

## 2025-05-02 PROCEDURE — 99285 EMERGENCY DEPT VISIT HI MDM: CPT

## 2025-05-02 PROCEDURE — 82947 ASSAY GLUCOSE BLOOD QUANT: CPT

## 2025-05-02 PROCEDURE — 70450 CT HEAD/BRAIN W/O DYE: CPT

## 2025-05-02 PROCEDURE — 85610 PROTHROMBIN TIME: CPT

## 2025-05-02 PROCEDURE — 2000000000 HC ICU R&B

## 2025-05-02 RX ORDER — ENOXAPARIN SODIUM 100 MG/ML
30 INJECTION SUBCUTANEOUS 2 TIMES DAILY
Status: DISCONTINUED | OUTPATIENT
Start: 2025-05-02 | End: 2025-05-05 | Stop reason: HOSPADM

## 2025-05-02 RX ORDER — ONDANSETRON 4 MG/1
4 TABLET, ORALLY DISINTEGRATING ORAL EVERY 6 HOURS PRN
Status: DISCONTINUED | OUTPATIENT
Start: 2025-05-02 | End: 2025-05-05 | Stop reason: HOSPADM

## 2025-05-02 RX ORDER — POLYETHYLENE GLYCOL 3350 17 G/17G
17 POWDER, FOR SOLUTION ORAL DAILY
Status: DISCONTINUED | OUTPATIENT
Start: 2025-05-03 | End: 2025-05-05 | Stop reason: HOSPADM

## 2025-05-02 RX ORDER — IOPAMIDOL 755 MG/ML
130 INJECTION, SOLUTION INTRAVASCULAR
Status: COMPLETED | OUTPATIENT
Start: 2025-05-02 | End: 2025-05-02

## 2025-05-02 RX ORDER — HALOPERIDOL 5 MG/ML
INJECTION INTRAMUSCULAR
Status: DISCONTINUED
Start: 2025-05-02 | End: 2025-05-02

## 2025-05-02 RX ORDER — POTASSIUM CHLORIDE 1500 MG/1
40 TABLET, EXTENDED RELEASE ORAL ONCE
Status: DISCONTINUED | OUTPATIENT
Start: 2025-05-02 | End: 2025-05-02

## 2025-05-02 RX ORDER — POTASSIUM CHLORIDE 7.45 MG/ML
10 INJECTION INTRAVENOUS
Status: DISPENSED | OUTPATIENT
Start: 2025-05-02 | End: 2025-05-03

## 2025-05-02 RX ORDER — POTASSIUM CHLORIDE 7.45 MG/ML
10 INJECTION INTRAVENOUS PRN
Status: DISCONTINUED | OUTPATIENT
Start: 2025-05-02 | End: 2025-05-05 | Stop reason: HOSPADM

## 2025-05-02 RX ORDER — GABAPENTIN 300 MG/1
300 CAPSULE ORAL EVERY 8 HOURS
Status: DISCONTINUED | OUTPATIENT
Start: 2025-05-02 | End: 2025-05-05 | Stop reason: HOSPADM

## 2025-05-02 RX ORDER — MIDAZOLAM HYDROCHLORIDE 2 MG/2ML
2 INJECTION, SOLUTION INTRAMUSCULAR; INTRAVENOUS ONCE
Status: COMPLETED | OUTPATIENT
Start: 2025-05-02 | End: 2025-05-02

## 2025-05-02 RX ORDER — ACETAMINOPHEN 500 MG
1000 TABLET ORAL EVERY 8 HOURS SCHEDULED
Status: DISCONTINUED | OUTPATIENT
Start: 2025-05-02 | End: 2025-05-05 | Stop reason: HOSPADM

## 2025-05-02 RX ORDER — MAGNESIUM SULFATE IN WATER 40 MG/ML
2000 INJECTION, SOLUTION INTRAVENOUS PRN
Status: DISCONTINUED | OUTPATIENT
Start: 2025-05-02 | End: 2025-05-05 | Stop reason: HOSPADM

## 2025-05-02 RX ORDER — DEXAMETHASONE SODIUM PHOSPHATE 10 MG/ML
10 INJECTION, SOLUTION INTRAMUSCULAR; INTRAVENOUS ONCE
Status: COMPLETED | OUTPATIENT
Start: 2025-05-02 | End: 2025-05-02

## 2025-05-02 RX ORDER — FENTANYL CITRATE 50 UG/ML
50 INJECTION, SOLUTION INTRAMUSCULAR; INTRAVENOUS ONCE
Status: COMPLETED | OUTPATIENT
Start: 2025-05-02 | End: 2025-05-02

## 2025-05-02 RX ORDER — SENNOSIDES A AND B 8.6 MG/1
1 TABLET, FILM COATED ORAL DAILY PRN
Status: DISCONTINUED | OUTPATIENT
Start: 2025-05-02 | End: 2025-05-05 | Stop reason: HOSPADM

## 2025-05-02 RX ORDER — HALOPERIDOL 5 MG/ML
5 INJECTION INTRAMUSCULAR ONCE
Status: COMPLETED | OUTPATIENT
Start: 2025-05-02 | End: 2025-05-02

## 2025-05-02 RX ORDER — FENTANYL CITRATE 50 UG/ML
INJECTION, SOLUTION INTRAMUSCULAR; INTRAVENOUS
Status: COMPLETED
Start: 2025-05-02 | End: 2025-05-02

## 2025-05-02 RX ORDER — MAGNESIUM SULFATE IN WATER 40 MG/ML
2000 INJECTION, SOLUTION INTRAVENOUS ONCE
Status: COMPLETED | OUTPATIENT
Start: 2025-05-02 | End: 2025-05-02

## 2025-05-02 RX ORDER — HALOPERIDOL 5 MG/ML
5 INJECTION INTRAMUSCULAR ONCE
Status: DISCONTINUED | OUTPATIENT
Start: 2025-05-02 | End: 2025-05-02

## 2025-05-02 RX ORDER — POTASSIUM CHLORIDE 7.45 MG/ML
10 INJECTION INTRAVENOUS
Status: COMPLETED | OUTPATIENT
Start: 2025-05-02 | End: 2025-05-02

## 2025-05-02 RX ORDER — SODIUM CHLORIDE 0.9 % (FLUSH) 0.9 %
5-40 SYRINGE (ML) INJECTION EVERY 12 HOURS SCHEDULED
Status: DISCONTINUED | OUTPATIENT
Start: 2025-05-02 | End: 2025-05-05 | Stop reason: HOSPADM

## 2025-05-02 RX ORDER — ONDANSETRON 2 MG/ML
4 INJECTION INTRAMUSCULAR; INTRAVENOUS EVERY 6 HOURS PRN
Status: DISCONTINUED | OUTPATIENT
Start: 2025-05-02 | End: 2025-05-05 | Stop reason: HOSPADM

## 2025-05-02 RX ORDER — SODIUM CHLORIDE 9 MG/ML
INJECTION, SOLUTION INTRAVENOUS PRN
Status: DISCONTINUED | OUTPATIENT
Start: 2025-05-02 | End: 2025-05-05 | Stop reason: HOSPADM

## 2025-05-02 RX ORDER — SODIUM CHLORIDE 0.9 % (FLUSH) 0.9 %
5-40 SYRINGE (ML) INJECTION PRN
Status: DISCONTINUED | OUTPATIENT
Start: 2025-05-02 | End: 2025-05-05 | Stop reason: HOSPADM

## 2025-05-02 RX ORDER — OXYCODONE HYDROCHLORIDE 5 MG/1
5 TABLET ORAL EVERY 6 HOURS PRN
Status: DISCONTINUED | OUTPATIENT
Start: 2025-05-02 | End: 2025-05-05 | Stop reason: HOSPADM

## 2025-05-02 RX ORDER — METHOCARBAMOL 750 MG/1
750 TABLET, FILM COATED ORAL EVERY 6 HOURS
Status: DISCONTINUED | OUTPATIENT
Start: 2025-05-02 | End: 2025-05-05 | Stop reason: HOSPADM

## 2025-05-02 RX ORDER — MIDAZOLAM HYDROCHLORIDE 1 MG/ML
INJECTION, SOLUTION INTRAMUSCULAR; INTRAVENOUS
Status: DISCONTINUED
Start: 2025-05-02 | End: 2025-05-02

## 2025-05-02 RX ORDER — POTASSIUM CHLORIDE 7.45 MG/ML
10 INJECTION INTRAVENOUS
Status: DISCONTINUED | OUTPATIENT
Start: 2025-05-02 | End: 2025-05-02

## 2025-05-02 RX ORDER — SODIUM CHLORIDE, SODIUM LACTATE, POTASSIUM CHLORIDE, CALCIUM CHLORIDE 600; 310; 30; 20 MG/100ML; MG/100ML; MG/100ML; MG/100ML
INJECTION, SOLUTION INTRAVENOUS CONTINUOUS
Status: DISCONTINUED | OUTPATIENT
Start: 2025-05-02 | End: 2025-05-03

## 2025-05-02 RX ORDER — POTASSIUM CHLORIDE 29.8 MG/ML
20 INJECTION INTRAVENOUS PRN
Status: DISCONTINUED | OUTPATIENT
Start: 2025-05-02 | End: 2025-05-05 | Stop reason: HOSPADM

## 2025-05-02 RX ADMIN — MIDAZOLAM HYDROCHLORIDE 2 MG: 2 INJECTION, SOLUTION INTRAMUSCULAR; INTRAVENOUS at 18:09

## 2025-05-02 RX ADMIN — HALOPERIDOL LACTATE 5 MG: 5 INJECTION, SOLUTION INTRAMUSCULAR at 18:07

## 2025-05-02 RX ADMIN — HALOPERIDOL LACTATE 5 MG: 5 INJECTION, SOLUTION INTRAMUSCULAR at 18:03

## 2025-05-02 RX ADMIN — HALOPERIDOL 5 MG: 5 INJECTION INTRAMUSCULAR at 18:07

## 2025-05-02 RX ADMIN — POTASSIUM CHLORIDE 10 MEQ: 7.45 INJECTION INTRAVENOUS at 21:19

## 2025-05-02 RX ADMIN — POTASSIUM CHLORIDE 10 MEQ: 7.45 INJECTION INTRAVENOUS at 22:27

## 2025-05-02 RX ADMIN — MIDAZOLAM 2 MG: 1 INJECTION INTRAMUSCULAR; INTRAVENOUS at 18:09

## 2025-05-02 RX ADMIN — FENTANYL CITRATE 50 MCG: 50 INJECTION, SOLUTION INTRAMUSCULAR; INTRAVENOUS at 17:51

## 2025-05-02 RX ADMIN — TETANUS TOXOID, REDUCED DIPHTHERIA TOXOID AND ACELLULAR PERTUSSIS VACCINE, ADSORBED 0.5 ML: 5; 2.5; 8; 8; 2.5 SUSPENSION INTRAMUSCULAR at 17:52

## 2025-05-02 RX ADMIN — POTASSIUM CHLORIDE 10 MEQ: 7.45 INJECTION INTRAVENOUS at 19:23

## 2025-05-02 RX ADMIN — DEXAMETHASONE SODIUM PHOSPHATE 10 MG: 10 INJECTION, SOLUTION INTRAMUSCULAR; INTRAVENOUS at 20:57

## 2025-05-02 RX ADMIN — MAGNESIUM SULFATE HEPTAHYDRATE 2000 MG: 40 INJECTION, SOLUTION INTRAVENOUS at 19:50

## 2025-05-02 RX ADMIN — HALOPERIDOL 5 MG: 5 INJECTION INTRAMUSCULAR at 18:03

## 2025-05-02 RX ADMIN — IOPAMIDOL 130 ML: 755 INJECTION, SOLUTION INTRAVENOUS at 18:11

## 2025-05-02 RX ADMIN — POTASSIUM CHLORIDE 10 MEQ: 7.45 INJECTION INTRAVENOUS at 20:16

## 2025-05-02 RX ADMIN — SODIUM CHLORIDE, SODIUM LACTATE, POTASSIUM CHLORIDE, AND CALCIUM CHLORIDE: .6; .31; .03; .02 INJECTION, SOLUTION INTRAVENOUS at 23:49

## 2025-05-02 ASSESSMENT — LIFESTYLE VARIABLES
HOW OFTEN DO YOU HAVE A DRINK CONTAINING ALCOHOL: PATIENT UNABLE TO ANSWER
HOW MANY STANDARD DRINKS CONTAINING ALCOHOL DO YOU HAVE ON A TYPICAL DAY: PATIENT UNABLE TO ANSWER
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
## 2025-05-03 PROBLEM — S19.9XXA INJURY OF NECK: Status: ACTIVE | Noted: 2025-05-03

## 2025-05-03 LAB
AMPHET UR QL SCN: NEGATIVE
ANION GAP SERPL CALCULATED.3IONS-SCNC: 13 MMOL/L (ref 9–16)
BARBITURATES UR QL SCN: NEGATIVE
BASOPHILS # BLD: <0.03 K/UL (ref 0–0.2)
BASOPHILS NFR BLD: 0 % (ref 0–2)
BENZODIAZ UR QL: POSITIVE
BUN SERPL-MCNC: 2 MG/DL (ref 6–20)
CA-I BLD-SCNC: 1.11 MMOL/L (ref 1.13–1.33)
CALCIUM SERPL-MCNC: 8.8 MG/DL (ref 8.6–10.4)
CANNABINOIDS UR QL SCN: POSITIVE
CHLORIDE SERPL-SCNC: 109 MMOL/L (ref 98–107)
CO2 SERPL-SCNC: 20 MMOL/L (ref 20–31)
COCAINE UR QL SCN: NEGATIVE
CREAT SERPL-MCNC: 0.6 MG/DL (ref 0.6–0.9)
EOSINOPHIL # BLD: <0.03 K/UL (ref 0–0.44)
EOSINOPHILS RELATIVE PERCENT: 0 % (ref 1–4)
ERYTHROCYTE [DISTWIDTH] IN BLOOD BY AUTOMATED COUNT: 14.1 % (ref 11.8–14.4)
ETHANOL PERCENT: 0.04 %
ETHANOLAMINE SERPL-MCNC: 45 MG/DL (ref 0–0.08)
FENTANYL UR QL: POSITIVE
GFR, ESTIMATED: >90 ML/MIN/1.73M2
GLUCOSE BLD-MCNC: 105 MG/DL (ref 65–105)
GLUCOSE SERPL-MCNC: 139 MG/DL (ref 74–99)
HCT VFR BLD AUTO: 36.4 % (ref 36.3–47.1)
HGB BLD-MCNC: 11.8 G/DL (ref 11.9–15.1)
IMM GRANULOCYTES # BLD AUTO: <0.03 K/UL (ref 0–0.3)
IMM GRANULOCYTES NFR BLD: 0 %
INR PPP: 1.1
LYMPHOCYTES NFR BLD: 0.92 K/UL (ref 1.1–3.7)
LYMPHOCYTES RELATIVE PERCENT: 12 % (ref 24–43)
MAGNESIUM SERPL-MCNC: 2.1 MG/DL (ref 1.6–2.6)
MCH RBC QN AUTO: 27.6 PG (ref 25.2–33.5)
MCHC RBC AUTO-ENTMCNC: 32.4 G/DL (ref 28.4–34.8)
MCV RBC AUTO: 85 FL (ref 82.6–102.9)
METHADONE UR QL: NEGATIVE
MONOCYTES NFR BLD: 0.07 K/UL (ref 0.1–1.2)
MONOCYTES NFR BLD: 1 % (ref 3–12)
NEUTROPHILS NFR BLD: 87 % (ref 36–65)
NEUTS SEG NFR BLD: 6.5 K/UL (ref 1.5–8.1)
NRBC BLD-RTO: 0 PER 100 WBC
OPIATES UR QL SCN: NEGATIVE
OXYCODONE UR QL SCN: NEGATIVE
PCP UR QL SCN: NEGATIVE
PHOSPHATE SERPL-MCNC: 1.8 MG/DL (ref 2.5–4.5)
PLATELET # BLD AUTO: 339 K/UL (ref 138–453)
PMV BLD AUTO: 9.4 FL (ref 8.1–13.5)
POTASSIUM SERPL-SCNC: 3.9 MMOL/L (ref 3.7–5.3)
PROTHROMBIN TIME: 14.7 SEC (ref 11.7–14.9)
RBC # BLD AUTO: 4.28 M/UL (ref 3.95–5.11)
SODIUM SERPL-SCNC: 142 MMOL/L (ref 136–145)
TEST INFORMATION: ABNORMAL
WBC OTHER # BLD: 7.5 K/UL (ref 3.5–11.3)

## 2025-05-03 PROCEDURE — 85610 PROTHROMBIN TIME: CPT

## 2025-05-03 PROCEDURE — 2580000003 HC RX 258

## 2025-05-03 PROCEDURE — G0480 DRUG TEST DEF 1-7 CLASSES: HCPCS

## 2025-05-03 PROCEDURE — 97535 SELF CARE MNGMENT TRAINING: CPT

## 2025-05-03 PROCEDURE — 36415 COLL VENOUS BLD VENIPUNCTURE: CPT

## 2025-05-03 PROCEDURE — 82947 ASSAY GLUCOSE BLOOD QUANT: CPT

## 2025-05-03 PROCEDURE — 99232 SBSQ HOSP IP/OBS MODERATE 35: CPT | Performed by: SURGERY

## 2025-05-03 PROCEDURE — 2000000000 HC ICU R&B

## 2025-05-03 PROCEDURE — 82330 ASSAY OF CALCIUM: CPT

## 2025-05-03 PROCEDURE — 2700000000 HC OXYGEN THERAPY PER DAY

## 2025-05-03 PROCEDURE — 6360000002 HC RX W HCPCS

## 2025-05-03 PROCEDURE — 84100 ASSAY OF PHOSPHORUS: CPT

## 2025-05-03 PROCEDURE — 97166 OT EVAL MOD COMPLEX 45 MIN: CPT

## 2025-05-03 PROCEDURE — 6370000000 HC RX 637 (ALT 250 FOR IP)

## 2025-05-03 PROCEDURE — 94761 N-INVAS EAR/PLS OXIMETRY MLT: CPT

## 2025-05-03 PROCEDURE — 85025 COMPLETE CBC W/AUTO DIFF WBC: CPT

## 2025-05-03 PROCEDURE — 97530 THERAPEUTIC ACTIVITIES: CPT

## 2025-05-03 PROCEDURE — 80048 BASIC METABOLIC PNL TOTAL CA: CPT

## 2025-05-03 PROCEDURE — 80307 DRUG TEST PRSMV CHEM ANLYZR: CPT

## 2025-05-03 PROCEDURE — 99254 IP/OBS CNSLTJ NEW/EST MOD 60: CPT | Performed by: STUDENT IN AN ORGANIZED HEALTH CARE EDUCATION/TRAINING PROGRAM

## 2025-05-03 PROCEDURE — 2500000003 HC RX 250 WO HCPCS

## 2025-05-03 PROCEDURE — 97162 PT EVAL MOD COMPLEX 30 MIN: CPT

## 2025-05-03 PROCEDURE — 83735 ASSAY OF MAGNESIUM: CPT

## 2025-05-03 RX ORDER — PHENOBARBITAL 32.4 MG/1
16.2 TABLET ORAL EVERY 6 HOURS PRN
Status: DISCONTINUED | OUTPATIENT
Start: 2025-05-05 | End: 2025-05-05 | Stop reason: HOSPADM

## 2025-05-03 RX ORDER — PHENOBARBITAL 32.4 MG/1
32.4 TABLET ORAL 4 TIMES DAILY
Status: ACTIVE | OUTPATIENT
Start: 2025-05-03 | End: 2025-05-04

## 2025-05-03 RX ORDER — PHENOBARBITAL 32.4 MG/1
16.2 TABLET ORAL 2 TIMES DAILY
Status: DISCONTINUED | OUTPATIENT
Start: 2025-05-05 | End: 2025-05-05 | Stop reason: HOSPADM

## 2025-05-03 RX ORDER — PHENOBARBITAL 32.4 MG/1
32.4 TABLET ORAL 2 TIMES DAILY
Status: DISPENSED | OUTPATIENT
Start: 2025-05-04 | End: 2025-05-05

## 2025-05-03 RX ORDER — DEXTROSE MONOHYDRATE AND SODIUM CHLORIDE 5; .9 G/100ML; G/100ML
INJECTION, SOLUTION INTRAVENOUS CONTINUOUS
Status: DISCONTINUED | OUTPATIENT
Start: 2025-05-03 | End: 2025-05-05 | Stop reason: HOSPADM

## 2025-05-03 RX ORDER — PHENOBARBITAL 32.4 MG/1
32.4 TABLET ORAL EVERY 6 HOURS PRN
Status: ACTIVE | OUTPATIENT
Start: 2025-05-03 | End: 2025-05-05

## 2025-05-03 RX ADMIN — OXYCODONE 5 MG: 5 TABLET ORAL at 11:24

## 2025-05-03 RX ADMIN — METHOCARBAMOL 750 MG: 750 TABLET ORAL at 09:58

## 2025-05-03 RX ADMIN — GABAPENTIN 300 MG: 300 CAPSULE ORAL at 23:30

## 2025-05-03 RX ADMIN — GABAPENTIN 300 MG: 300 CAPSULE ORAL at 09:58

## 2025-05-03 RX ADMIN — METHOCARBAMOL 750 MG: 750 TABLET ORAL at 17:07

## 2025-05-03 RX ADMIN — ACETAMINOPHEN 1000 MG: 500 TABLET ORAL at 17:07

## 2025-05-03 RX ADMIN — DEXTROSE AND SODIUM CHLORIDE: 5; .9 INJECTION, SOLUTION INTRAVENOUS at 11:28

## 2025-05-03 RX ADMIN — SODIUM CHLORIDE, PRESERVATIVE FREE 10 ML: 5 INJECTION INTRAVENOUS at 10:02

## 2025-05-03 RX ADMIN — POTASSIUM CHLORIDE 10 MEQ: 7.46 INJECTION, SOLUTION INTRAVENOUS at 00:22

## 2025-05-03 RX ADMIN — DEXTROSE AND SODIUM CHLORIDE: 5; .9 INJECTION, SOLUTION INTRAVENOUS at 11:29

## 2025-05-03 RX ADMIN — GABAPENTIN 300 MG: 300 CAPSULE ORAL at 17:07

## 2025-05-03 RX ADMIN — ACETAMINOPHEN 1000 MG: 500 TABLET ORAL at 22:00

## 2025-05-03 RX ADMIN — METHOCARBAMOL 750 MG: 750 TABLET ORAL at 23:30
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
## 2025-05-03 NOTE — CARE COORDINATION
SW met with pt to complete SBIRT. Pt disclosed she does drink alcohol \"many days, a lot\" but did not provide specifics. Pt disclosed she does not have concerns of her alcohol use. Pt reports marijuana use, denies further drug use, but did test positive for benzodiazepines per UA as well as marijuana. Pt unable to clarify how much she uses marijuana, stating \"whenever I can get it\". Pt denies concerns of her substance use impacting her daily functioning and denies concerns of substance abuse. Pt denies resources. Pt reports she has not been depressed, but has been \"feeling down\" lately. Pt did not disclose reasons why, but reports she is not concerned about her symptoms. SW available for further support as needed.         Alcohol Screening and Brief Intervention        No results for input(s): \"ALC\" in the last 72 hours.    Alcohol Pre-screening     (WOMEN ONLY) How many times in the past year have you had 4 or more drinks in a day?: 1 or more  TOTAL SCORE:: 3    Drug Pre-Screening    Yes    Drug Screening DAST  TOTAL SCORE:: 1    Mood Pre-Screening (PHQ-2)  During the past 2 weeks, have you been bothered by, feeling down, depressed or hopeless?  Yes  Feeling down, depressed or hopeless      I have interviewed Bre Phillips, [id number] regarding  Her alcohol consumption/drug use and risk for excessive use. Screenings were positive.  Patient  Declined intervention at this time. Please see social work note regarding intervention.    Deferred []    Completed on: 5/3/2025   [employer]

## 2025-05-03 NOTE — PLAN OF CARE
Problem: Discharge Planning  Goal: Discharge to home or other facility with appropriate resources  Outcome: Progressing     Problem: Risk for Elopement  Goal: Patient will not exit the unit/facility without proper excort  Outcome: Progressing     Problem: Pain  Goal: Verbalizes/displays adequate comfort level or baseline comfort level  Outcome: Progressing

## 2025-05-03 NOTE — PLAN OF CARE
Problem: Discharge Planning  Goal: Discharge to home or other facility with appropriate resources  Outcome: Progressing     Problem: Risk for Elopement  Goal: Patient will not exit the unit/facility without proper excort  Outcome: Progressing  Flowsheets (Taken 5/2/2025 1817 by Eloise Jordan RN)  Nursing Interventions for Elopement Risk: Communicate/escalate to charge nurse the risk of elopement     Problem: Pain  Goal: Verbalizes/displays adequate comfort level or baseline comfort level  Outcome: Progressing

## 2025-05-04 PROBLEM — S12.8XXA: Status: ACTIVE | Noted: 2025-05-04

## 2025-05-04 LAB
ANION GAP SERPL CALCULATED.3IONS-SCNC: 10 MMOL/L (ref 9–16)
BASOPHILS # BLD: 0.03 K/UL (ref 0–0.2)
BASOPHILS NFR BLD: 0 % (ref 0–2)
BUN SERPL-MCNC: 5 MG/DL (ref 6–20)
CALCIUM SERPL-MCNC: 8.5 MG/DL (ref 8.6–10.4)
CHLORIDE SERPL-SCNC: 108 MMOL/L (ref 98–107)
CO2 SERPL-SCNC: 22 MMOL/L (ref 20–31)
CREAT SERPL-MCNC: 0.6 MG/DL (ref 0.6–0.9)
EOSINOPHIL # BLD: 0.05 K/UL (ref 0–0.44)
EOSINOPHILS RELATIVE PERCENT: 0 % (ref 1–4)
ERYTHROCYTE [DISTWIDTH] IN BLOOD BY AUTOMATED COUNT: 14.3 % (ref 11.8–14.4)
GFR, ESTIMATED: >90 ML/MIN/1.73M2
GLUCOSE SERPL-MCNC: 103 MG/DL (ref 74–99)
HCT VFR BLD AUTO: 35.8 % (ref 36.3–47.1)
HGB BLD-MCNC: 11.2 G/DL (ref 11.9–15.1)
IMM GRANULOCYTES # BLD AUTO: 0.03 K/UL (ref 0–0.3)
IMM GRANULOCYTES NFR BLD: 0 %
LYMPHOCYTES NFR BLD: 3.78 K/UL (ref 1.1–3.7)
LYMPHOCYTES RELATIVE PERCENT: 32 % (ref 24–43)
MAGNESIUM SERPL-MCNC: 1.9 MG/DL (ref 1.6–2.6)
MCH RBC QN AUTO: 27.3 PG (ref 25.2–33.5)
MCHC RBC AUTO-ENTMCNC: 31.3 G/DL (ref 28.4–34.8)
MCV RBC AUTO: 87.3 FL (ref 82.6–102.9)
MONOCYTES NFR BLD: 0.83 K/UL (ref 0.1–1.2)
MONOCYTES NFR BLD: 7 % (ref 3–12)
NEUTROPHILS NFR BLD: 60 % (ref 36–65)
NEUTS SEG NFR BLD: 7.03 K/UL (ref 1.5–8.1)
NRBC BLD-RTO: 0 PER 100 WBC
PHOSPHATE SERPL-MCNC: 2.3 MG/DL (ref 2.5–4.5)
PLATELET # BLD AUTO: 303 K/UL (ref 138–453)
PMV BLD AUTO: 9.6 FL (ref 8.1–13.5)
POTASSIUM SERPL-SCNC: 3.4 MMOL/L (ref 3.7–5.3)
RBC # BLD AUTO: 4.1 M/UL (ref 3.95–5.11)
SODIUM SERPL-SCNC: 140 MMOL/L (ref 136–145)
WBC OTHER # BLD: 11.8 K/UL (ref 3.5–11.3)

## 2025-05-04 PROCEDURE — 99232 SBSQ HOSP IP/OBS MODERATE 35: CPT | Performed by: STUDENT IN AN ORGANIZED HEALTH CARE EDUCATION/TRAINING PROGRAM

## 2025-05-04 PROCEDURE — 83735 ASSAY OF MAGNESIUM: CPT

## 2025-05-04 PROCEDURE — 94760 N-INVAS EAR/PLS OXIMETRY 1: CPT

## 2025-05-04 PROCEDURE — 80048 BASIC METABOLIC PNL TOTAL CA: CPT

## 2025-05-04 PROCEDURE — 84100 ASSAY OF PHOSPHORUS: CPT

## 2025-05-04 PROCEDURE — 6370000000 HC RX 637 (ALT 250 FOR IP)

## 2025-05-04 PROCEDURE — 6360000002 HC RX W HCPCS

## 2025-05-04 PROCEDURE — 31575 DIAGNOSTIC LARYNGOSCOPY: CPT | Performed by: STUDENT IN AN ORGANIZED HEALTH CARE EDUCATION/TRAINING PROGRAM

## 2025-05-04 PROCEDURE — 2060000000 HC ICU INTERMEDIATE R&B

## 2025-05-04 PROCEDURE — 2700000000 HC OXYGEN THERAPY PER DAY

## 2025-05-04 PROCEDURE — 85025 COMPLETE CBC W/AUTO DIFF WBC: CPT

## 2025-05-04 PROCEDURE — 36415 COLL VENOUS BLD VENIPUNCTURE: CPT

## 2025-05-04 PROCEDURE — 99233 SBSQ HOSP IP/OBS HIGH 50: CPT | Performed by: SURGERY

## 2025-05-04 PROCEDURE — 2500000003 HC RX 250 WO HCPCS

## 2025-05-04 RX ORDER — LISINOPRIL 5 MG/1
5 TABLET ORAL DAILY
Status: DISCONTINUED | OUTPATIENT
Start: 2025-05-04 | End: 2025-05-05 | Stop reason: HOSPADM

## 2025-05-04 RX ORDER — LABETALOL HYDROCHLORIDE 5 MG/ML
10 INJECTION, SOLUTION INTRAVENOUS EVERY 4 HOURS PRN
Status: DISCONTINUED | OUTPATIENT
Start: 2025-05-04 | End: 2025-05-05 | Stop reason: HOSPADM

## 2025-05-04 RX ORDER — LIDOCAINE 4 G/G
1 PATCH TOPICAL DAILY
Status: DISCONTINUED | OUTPATIENT
Start: 2025-05-04 | End: 2025-05-05 | Stop reason: HOSPADM

## 2025-05-04 RX ORDER — HYDRALAZINE HYDROCHLORIDE 20 MG/ML
10 INJECTION INTRAMUSCULAR; INTRAVENOUS EVERY 4 HOURS PRN
Status: DISCONTINUED | OUTPATIENT
Start: 2025-05-04 | End: 2025-05-05 | Stop reason: HOSPADM

## 2025-05-04 RX ORDER — KETOROLAC TROMETHAMINE 15 MG/ML
15 INJECTION, SOLUTION INTRAMUSCULAR; INTRAVENOUS EVERY 6 HOURS
Status: DISCONTINUED | OUTPATIENT
Start: 2025-05-04 | End: 2025-05-05 | Stop reason: HOSPADM

## 2025-05-04 RX ORDER — AMLODIPINE BESYLATE 10 MG/1
10 TABLET ORAL DAILY
Status: DISCONTINUED | OUTPATIENT
Start: 2025-05-04 | End: 2025-05-05 | Stop reason: HOSPADM

## 2025-05-04 RX ADMIN — KETOROLAC TROMETHAMINE 15 MG: 15 INJECTION, SOLUTION INTRAMUSCULAR; INTRAVENOUS at 12:55

## 2025-05-04 RX ADMIN — METHOCARBAMOL 750 MG: 750 TABLET ORAL at 17:03

## 2025-05-04 RX ADMIN — METHOCARBAMOL 750 MG: 750 TABLET ORAL at 23:18

## 2025-05-04 RX ADMIN — ACETAMINOPHEN 1000 MG: 500 TABLET ORAL at 12:55

## 2025-05-04 RX ADMIN — METHOCARBAMOL 750 MG: 750 TABLET ORAL at 12:55

## 2025-05-04 RX ADMIN — SODIUM CHLORIDE, PRESERVATIVE FREE 10 ML: 5 INJECTION INTRAVENOUS at 20:19

## 2025-05-04 RX ADMIN — METHOCARBAMOL 750 MG: 750 TABLET ORAL at 05:34

## 2025-05-04 RX ADMIN — GABAPENTIN 300 MG: 300 CAPSULE ORAL at 09:07

## 2025-05-04 RX ADMIN — ACETAMINOPHEN 1000 MG: 500 TABLET ORAL at 05:34

## 2025-05-04 RX ADMIN — ACETAMINOPHEN 1000 MG: 500 TABLET ORAL at 20:55

## 2025-05-04 RX ADMIN — POTASSIUM CHLORIDE 10 MEQ: 7.46 INJECTION, SOLUTION INTRAVENOUS at 09:03

## 2025-05-04 RX ADMIN — POTASSIUM CHLORIDE 10 MEQ: 7.46 INJECTION, SOLUTION INTRAVENOUS at 06:49

## 2025-05-04 RX ADMIN — POTASSIUM CHLORIDE 10 MEQ: 7.46 INJECTION, SOLUTION INTRAVENOUS at 05:34

## 2025-05-04 RX ADMIN — KETOROLAC TROMETHAMINE 15 MG: 15 INJECTION, SOLUTION INTRAMUSCULAR; INTRAVENOUS at 19:37

## 2025-05-04 RX ADMIN — PHENOBARBITAL 32.4 MG: 32.4 TABLET ORAL at 20:18

## 2025-05-04 RX ADMIN — DICLOFENAC SODIUM 2 G: 10 GEL TOPICAL at 20:20

## 2025-05-04 RX ADMIN — GABAPENTIN 300 MG: 300 CAPSULE ORAL at 17:03

## 2025-05-04 RX ADMIN — Medication 5 MG: at 23:56

## 2025-05-04 RX ADMIN — POTASSIUM CHLORIDE 10 MEQ: 7.46 INJECTION, SOLUTION INTRAVENOUS at 07:50

## 2025-05-04 RX ADMIN — GABAPENTIN 300 MG: 300 CAPSULE ORAL at 23:18

## 2025-05-04 ASSESSMENT — PAIN SCALES - GENERAL
PAINLEVEL_OUTOF10: 7
PAINLEVEL_OUTOF10: 7
PAINLEVEL_OUTOF10: 4
PAINLEVEL_OUTOF10: 7
PAINLEVEL_OUTOF10: 5

## 2025-05-04 ASSESSMENT — PAIN DESCRIPTION - LOCATION
LOCATION: OTHER (COMMENT)
LOCATION: RIB CAGE

## 2025-05-04 ASSESSMENT — PAIN DESCRIPTION - DESCRIPTORS
DESCRIPTORS: DISCOMFORT
DESCRIPTORS: ACHING

## 2025-05-04 ASSESSMENT — PAIN DESCRIPTION - ORIENTATION
ORIENTATION: RIGHT
ORIENTATION: RIGHT

## 2025-05-04 NOTE — PLAN OF CARE
Problem: Discharge Planning  Goal: Discharge to home or other facility with appropriate resources  Outcome: Progressing     Problem: Risk for Elopement  Goal: Patient will not exit the unit/facility without proper excort  Outcome: Progressing     Problem: Pain  Goal: Verbalizes/displays adequate comfort level or baseline comfort level  Outcome: Progressing     Problem: Respiratory - Adult  Goal: Achieves optimal ventilation and oxygenation  Outcome: Progressing

## 2025-05-04 NOTE — DISCHARGE INSTRUCTIONS
You need to make a follow-up appointment in the ENT clinic in  1-2month(s) from today.          For all cancellations, please call our Central Scheduling number at [phone] option 1 at least 48 hours prior to your scheduled appointment.    Useful Numbers:     ENT Nurse triage line     [phone] Option 3  (ENT-related questions or concerns, 8am-4pm, Monday through Friday)  Main Office         [phone]  (to schedule routine appointments)   After hours contact number [phone]  (After 4pm Monday through Friday and weekends; ask to speak with ENT physician on call)

## 2025-05-04 NOTE — PLAN OF CARE
Problem: Discharge Planning  Goal: Discharge to home or other facility with appropriate resources  5/4/2025 0222 by Lamont Lai RN  Outcome: Progressing  5/3/2025 1855 by Luz Marina Parnell RN  Outcome: Progressing     Problem: Risk for Elopement  Goal: Patient will not exit the unit/facility without proper excort  5/4/2025 0222 by Lamont Lai RN  Outcome: Progressing  5/3/2025 1855 by Luz Marina Parnell RN  Outcome: Progressing     Problem: Pain  Goal: Verbalizes/displays adequate comfort level or baseline comfort level  5/4/2025 0222 by Lamont Lai RN  Outcome: Progressing  5/3/2025 1855 by Luz Marina Parnell RN  Outcome: Progressing     Problem: Respiratory - Adult  Goal: Achieves optimal ventilation and oxygenation  Outcome: Progressing  Flowsheets (Taken 5/3/2025 2152 by Nakita Ramirez ENRIKE)  Achieves optimal ventilation and oxygenation:   Assess for changes in respiratory status   Assess for changes in mentation and behavior   Position to facilitate oxygenation and minimize respiratory effort   Oxygen supplementation based on oxygen saturation or arterial blood gases   Encourage broncho-pulmonary hygiene including cough, deep breathe, incentive spirometry   Assess the need for suctioning and aspirate as needed   Assess and instruct to report shortness of breath or any respiratory difficulty   Respiratory therapy support as indicated

## 2025-05-05 ENCOUNTER — HOSPITAL ENCOUNTER (OUTPATIENT)
Dept: PSYCHIATRY | Age: 55
Discharge: HOME | End: 2025-05-07

## 2025-05-05 VITALS
WEIGHT: 120.15 LBS | BODY MASS INDEX: 22.11 KG/M2 | HEIGHT: 62 IN | OXYGEN SATURATION: 99 % | DIASTOLIC BLOOD PRESSURE: 82 MMHG | RESPIRATION RATE: 19 BRPM | SYSTOLIC BLOOD PRESSURE: 144 MMHG | TEMPERATURE: 99.9 F | HEART RATE: 95 BPM

## 2025-05-05 LAB
ANION GAP SERPL CALCULATED.3IONS-SCNC: 9 MMOL/L (ref 9–16)
BASOPHILS # BLD: 0.04 K/UL (ref 0–0.2)
BASOPHILS NFR BLD: 0 % (ref 0–2)
BUN SERPL-MCNC: 6 MG/DL (ref 6–20)
CALCIUM SERPL-MCNC: 8.9 MG/DL (ref 8.6–10.4)
CHLORIDE SERPL-SCNC: 105 MMOL/L (ref 98–107)
CO2 SERPL-SCNC: 23 MMOL/L (ref 20–31)
CREAT SERPL-MCNC: 0.6 MG/DL (ref 0.6–0.9)
EOSINOPHIL # BLD: 0.21 K/UL (ref 0–0.44)
EOSINOPHILS RELATIVE PERCENT: 2 % (ref 1–4)
ERYTHROCYTE [DISTWIDTH] IN BLOOD BY AUTOMATED COUNT: 13.9 % (ref 11.8–14.4)
GFR, ESTIMATED: >90 ML/MIN/1.73M2
GLUCOSE SERPL-MCNC: 89 MG/DL (ref 74–99)
HCT VFR BLD AUTO: 33.8 % (ref 36.3–47.1)
HGB BLD-MCNC: 10.7 G/DL (ref 11.9–15.1)
IMM GRANULOCYTES # BLD AUTO: 0.03 K/UL (ref 0–0.3)
IMM GRANULOCYTES NFR BLD: 0 %
LYMPHOCYTES NFR BLD: 4.51 K/UL (ref 1.1–3.7)
LYMPHOCYTES RELATIVE PERCENT: 41 % (ref 24–43)
MAGNESIUM SERPL-MCNC: 1.8 MG/DL (ref 1.6–2.6)
MCH RBC QN AUTO: 27.3 PG (ref 25.2–33.5)
MCHC RBC AUTO-ENTMCNC: 31.7 G/DL (ref 28.4–34.8)
MCV RBC AUTO: 86.2 FL (ref 82.6–102.9)
MONOCYTES NFR BLD: 0.59 K/UL (ref 0.1–1.2)
MONOCYTES NFR BLD: 5 % (ref 3–12)
NEUTROPHILS NFR BLD: 52 % (ref 36–65)
NEUTS SEG NFR BLD: 5.54 K/UL (ref 1.5–8.1)
NRBC BLD-RTO: 0 PER 100 WBC
PHOSPHATE SERPL-MCNC: 3.1 MG/DL (ref 2.5–4.5)
PLATELET # BLD AUTO: 282 K/UL (ref 138–453)
PMV BLD AUTO: 9.7 FL (ref 8.1–13.5)
POTASSIUM SERPL-SCNC: 3.5 MMOL/L (ref 3.7–5.3)
RBC # BLD AUTO: 3.92 M/UL (ref 3.95–5.11)
SODIUM SERPL-SCNC: 137 MMOL/L (ref 136–145)
WBC OTHER # BLD: 10.9 K/UL (ref 3.5–11.3)

## 2025-05-05 PROCEDURE — 85025 COMPLETE CBC W/AUTO DIFF WBC: CPT

## 2025-05-05 PROCEDURE — 2500000003 HC RX 250 WO HCPCS

## 2025-05-05 PROCEDURE — 80048 BASIC METABOLIC PNL TOTAL CA: CPT

## 2025-05-05 PROCEDURE — 96127 BRIEF EMOTIONAL/BEHAV ASSMT: CPT | Performed by: SOCIAL WORKER

## 2025-05-05 PROCEDURE — 36415 COLL VENOUS BLD VENIPUNCTURE: CPT

## 2025-05-05 PROCEDURE — 6370000000 HC RX 637 (ALT 250 FOR IP)

## 2025-05-05 PROCEDURE — 83735 ASSAY OF MAGNESIUM: CPT

## 2025-05-05 PROCEDURE — 6360000002 HC RX W HCPCS

## 2025-05-05 PROCEDURE — 84100 ASSAY OF PHOSPHORUS: CPT

## 2025-05-05 RX ORDER — AMLODIPINE BESYLATE 10 MG/1
10 TABLET ORAL DAILY
Qty: 30 TABLET | Refills: 3 | Status: SHIPPED | OUTPATIENT
Start: 2025-05-06

## 2025-05-05 RX ORDER — OXYCODONE HYDROCHLORIDE 5 MG/1
5 TABLET ORAL EVERY 8 HOURS PRN
Qty: 9 TABLET | Refills: 0 | Status: SHIPPED | OUTPATIENT
Start: 2025-05-05 | End: 2025-05-08

## 2025-05-05 RX ORDER — LIDOCAINE 4 G/G
1 PATCH TOPICAL DAILY
Qty: 3 EACH | Refills: 0 | Status: SHIPPED | OUTPATIENT
Start: 2025-05-06 | End: 2025-05-09

## 2025-05-05 RX ORDER — METHOCARBAMOL 750 MG/1
750 TABLET, FILM COATED ORAL 3 TIMES DAILY
Qty: 30 TABLET | Refills: 0 | Status: SHIPPED | OUTPATIENT
Start: 2025-05-05 | End: 2025-05-15

## 2025-05-05 RX ORDER — GABAPENTIN 300 MG/1
300 CAPSULE ORAL 3 TIMES DAILY
Qty: 30 CAPSULE | Refills: 0 | Status: SHIPPED | OUTPATIENT
Start: 2025-05-05 | End: 2025-05-15

## 2025-05-05 RX ADMIN — ACETAMINOPHEN 1000 MG: 500 TABLET ORAL at 15:00

## 2025-05-05 RX ADMIN — METHOCARBAMOL 750 MG: 750 TABLET ORAL at 11:53

## 2025-05-05 RX ADMIN — KETOROLAC TROMETHAMINE 15 MG: 15 INJECTION, SOLUTION INTRAMUSCULAR; INTRAVENOUS at 08:14

## 2025-05-05 RX ADMIN — AMLODIPINE BESYLATE 10 MG: 10 TABLET ORAL at 08:12

## 2025-05-05 RX ADMIN — SODIUM CHLORIDE, PRESERVATIVE FREE 10 ML: 5 INJECTION INTRAVENOUS at 09:00

## 2025-05-05 RX ADMIN — PHENOBARBITAL 16.2 MG: 32.4 TABLET ORAL at 08:17

## 2025-05-05 RX ADMIN — POTASSIUM BICARBONATE 20 MEQ: 782 TABLET, EFFERVESCENT ORAL at 05:43

## 2025-05-05 RX ADMIN — GABAPENTIN 300 MG: 300 CAPSULE ORAL at 15:00

## 2025-05-05 RX ADMIN — KETOROLAC TROMETHAMINE 15 MG: 15 INJECTION, SOLUTION INTRAMUSCULAR; INTRAVENOUS at 15:09

## 2025-05-05 RX ADMIN — LISINOPRIL 5 MG: 5 TABLET ORAL at 08:13

## 2025-05-05 RX ADMIN — KETOROLAC TROMETHAMINE 15 MG: 15 INJECTION, SOLUTION INTRAMUSCULAR; INTRAVENOUS at 00:33

## 2025-05-05 RX ADMIN — GABAPENTIN 300 MG: 300 CAPSULE ORAL at 08:13

## 2025-05-05 ASSESSMENT — PAIN DESCRIPTION - ORIENTATION
ORIENTATION: RIGHT
ORIENTATION: RIGHT

## 2025-05-05 ASSESSMENT — PAIN DESCRIPTION - LOCATION
LOCATION: RIB CAGE
LOCATION: RIB CAGE

## 2025-05-05 ASSESSMENT — PAIN SCALES - GENERAL
PAINLEVEL_OUTOF10: 7
PAINLEVEL_OUTOF10: 5

## 2025-05-05 ASSESSMENT — PAIN DESCRIPTION - DESCRIPTORS
DESCRIPTORS: ACHING
DESCRIPTORS: ACHING

## 2025-05-05 NOTE — PLAN OF CARE
Problem: Discharge Planning  Goal: Discharge to home or other facility with appropriate resources  5/4/2025 2331 by Alem Luu RN  Outcome: Progressing  5/4/2025 1826 by Luz Marina Parnell RN  Outcome: Progressing     Problem: Risk for Elopement  Goal: Patient will not exit the unit/facility without proper excort  5/4/2025 2331 by Alem Luu RN  Outcome: Progressing  5/4/2025 1826 by Luz Marina Parnell RN  Outcome: Progressing     Problem: Pain  Goal: Verbalizes/displays adequate comfort level or baseline comfort level  5/4/2025 2331 by Alem Luu RN  Outcome: Progressing  5/4/2025 1826 by Luz Marina Parnell RN  Outcome: Progressing     Problem: Respiratory - Adult  Goal: Achieves optimal ventilation and oxygenation  5/4/2025 2331 by Alem Luu RN  Outcome: Progressing  5/4/2025 1826 by Luz Marina Parnell RN  Outcome: Progressing

## 2025-05-05 NOTE — PLAN OF CARE
Problem: Discharge Planning  Goal: Discharge to home or other facility with appropriate resources  5/5/2025 1032 by Georgia Mendiola RN  Outcome: Progressing  5/4/2025 2331 by Alem Luu RN  Outcome: Progressing     Problem: Risk for Elopement  Goal: Patient will not exit the unit/facility without proper excort  5/5/2025 1032 by Georgia Mendiola RN  Outcome: Progressing  5/4/2025 2331 by Alem Luu RN  Outcome: Progressing     Problem: Pain  Goal: Verbalizes/displays adequate comfort level or baseline comfort level  5/5/2025 1032 by Georgia Mendiola RN  Outcome: Progressing  5/4/2025 2331 by Alem Luu RN  Outcome: Progressing     Problem: Respiratory - Adult  Goal: Achieves optimal ventilation and oxygenation  5/5/2025 1032 by Georgia Mendiola RN  Outcome: Progressing  5/4/2025 2331 by Alem Luu RN  Outcome: Progressing
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
